# Patient Record
Sex: FEMALE
[De-identification: names, ages, dates, MRNs, and addresses within clinical notes are randomized per-mention and may not be internally consistent; named-entity substitution may affect disease eponyms.]

---

## 2021-10-12 ENCOUNTER — NON-APPOINTMENT (OUTPATIENT)
Age: 76
End: 2021-10-12

## 2021-10-19 ENCOUNTER — NON-APPOINTMENT (OUTPATIENT)
Age: 76
End: 2021-10-19

## 2021-11-24 ENCOUNTER — APPOINTMENT (OUTPATIENT)
Dept: FAMILY MEDICINE | Facility: CLINIC | Age: 76
End: 2021-11-24
Payer: MEDICARE

## 2021-11-24 VITALS — HEART RATE: 70 BPM | SYSTOLIC BLOOD PRESSURE: 120 MMHG | DIASTOLIC BLOOD PRESSURE: 70 MMHG

## 2021-11-24 DIAGNOSIS — Z80.41 FAMILY HISTORY OF MALIGNANT NEOPLASM OF OVARY: ICD-10-CM

## 2021-11-24 DIAGNOSIS — I10 ESSENTIAL (PRIMARY) HYPERTENSION: ICD-10-CM

## 2021-11-24 DIAGNOSIS — M81.0 AGE-RELATED OSTEOPOROSIS W/OUT CURRENT PATHOLOGICAL FRACTURE: ICD-10-CM

## 2021-11-24 DIAGNOSIS — R55 SYNCOPE AND COLLAPSE: ICD-10-CM

## 2021-11-24 DIAGNOSIS — K59.09 OTHER CONSTIPATION: ICD-10-CM

## 2021-11-24 DIAGNOSIS — Z82.49 FAMILY HISTORY OF ISCHEMIC HEART DISEASE AND OTHER DISEASES OF THE CIRCULATORY SYSTEM: ICD-10-CM

## 2021-11-24 DIAGNOSIS — Z76.89 PERSONS ENCOUNTERING HEALTH SERVICES IN OTHER SPECIFIED CIRCUMSTANCES: ICD-10-CM

## 2021-11-24 PROCEDURE — 99205 OFFICE O/P NEW HI 60 MIN: CPT | Mod: 25,95

## 2021-11-24 PROCEDURE — G0444 DEPRESSION SCREEN ANNUAL: CPT | Mod: 95,59

## 2021-11-24 RX ORDER — NUTRITIONAL SUPPLEMENT/FIBER
LIQUID (GRAM) ORAL
Refills: 0 | Status: ACTIVE | COMMUNITY

## 2021-11-30 ENCOUNTER — TRANSCRIPTION ENCOUNTER (OUTPATIENT)
Age: 76
End: 2021-11-30

## 2021-11-30 ENCOUNTER — APPOINTMENT (OUTPATIENT)
Dept: FAMILY MEDICINE | Facility: CLINIC | Age: 76
End: 2021-11-30
Payer: MEDICARE

## 2021-11-30 ENCOUNTER — LABORATORY RESULT (OUTPATIENT)
Age: 76
End: 2021-11-30

## 2021-11-30 VITALS
SYSTOLIC BLOOD PRESSURE: 120 MMHG | RESPIRATION RATE: 12 BRPM | DIASTOLIC BLOOD PRESSURE: 80 MMHG | HEART RATE: 63 BPM | OXYGEN SATURATION: 98 % | WEIGHT: 121 LBS | HEIGHT: 62 IN | BODY MASS INDEX: 22.26 KG/M2

## 2021-11-30 DIAGNOSIS — Z13.29 ENCOUNTER FOR SCREENING FOR OTHER SUSPECTED ENDOCRINE DISORDER: ICD-10-CM

## 2021-11-30 DIAGNOSIS — Z11.59 ENCOUNTER FOR SCREENING FOR OTHER VIRAL DISEASES: ICD-10-CM

## 2021-11-30 DIAGNOSIS — Z13.0 ENCOUNTER FOR SCREENING FOR DISEASES OF THE BLOOD AND BLOOD-FORMING ORGANS AND CERTAIN DISORDERS INVOLVING THE IMMUNE MECHANISM: ICD-10-CM

## 2021-11-30 DIAGNOSIS — R31.9 HEMATURIA, UNSPECIFIED: ICD-10-CM

## 2021-11-30 DIAGNOSIS — Z13.220 ENCOUNTER FOR SCREENING FOR LIPOID DISORDERS: ICD-10-CM

## 2021-11-30 LAB
BILIRUB UR QL STRIP: NEGATIVE
GLUCOSE UR-MCNC: NEGATIVE
HCG UR QL: 0.2 EU/DL
HGB UR QL STRIP.AUTO: NORMAL
KETONES UR-MCNC: NORMAL
LEUKOCYTE ESTERASE UR QL STRIP: NORMAL
NITRITE UR QL STRIP: NEGATIVE
PH UR STRIP: 7
PROT UR STRIP-MCNC: NEGATIVE
SP GR UR STRIP: 1.01

## 2021-11-30 PROCEDURE — G0402 INITIAL PREVENTIVE EXAM: CPT

## 2021-11-30 PROCEDURE — 81003 URINALYSIS AUTO W/O SCOPE: CPT | Mod: QW

## 2021-11-30 PROCEDURE — 36415 COLL VENOUS BLD VENIPUNCTURE: CPT

## 2021-11-30 RX ORDER — ESTRADIOL 0.1 MG/G
0.1 CREAM VAGINAL
Refills: 0 | Status: ACTIVE | COMMUNITY

## 2021-11-30 NOTE — HEALTH RISK ASSESSMENT
[Patient reported colonoscopy was normal] : Patient reported colonoscopy was normal [Intercurrent hospitalizations] : was admitted to the hospital  [No] : In the past 12 months have you used drugs other than those required for medical reasons? No [0] : 2) Feeling down, depressed, or hopeless: Not at all (0) [PHQ-2 Negative - No further assessment needed] : PHQ-2 Negative - No further assessment needed [Patient reported mammogram was normal] : Patient reported mammogram was normal [Patient reported bone density results were abnormal] : Patient reported bone density results were abnormal [MammogramDate] : 2021 [BoneDensityComments] : OP [ColonoscopyDate] : 10 yrs ago  [] : No [de-identified] : syncope in August  [de-identified] : Dr. Lang (cardio), GI, acupuncture  [[id number]] : 0

## 2021-11-30 NOTE — REVIEW OF SYSTEMS
[Constipation] : constipation [Muscle Pain] : muscle pain [Negative] : Heme/Lymph [FreeTextEntry9] : leg cramping

## 2021-11-30 NOTE — PLAN
[FreeTextEntry1] : 76 yr old female establish care \par \par Will return in person for examination and routine blood work

## 2021-11-30 NOTE — HISTORY OF PRESENT ILLNESS
[FreeTextEntry8] : Establish care with new PCP. History of HTN ( not on medication), arthritis, vasovagal syncope, constipation (prunes, nourish and Pedley) and has to disimpact to relieve. Denies changes in vision, dizziness, chest pain, palpitations and lower extremity edema.\par \par \par States this year she was on a flight to [place] had repeated syncopal episodes and was taken to the hospital. She was seen by cardiologist had Holter monitor and workup was normal. In August she fainted again.\par \par Had COVID shot, declined flu, prior pneumonia vaccine

## 2021-12-01 ENCOUNTER — TRANSCRIPTION ENCOUNTER (OUTPATIENT)
Age: 76
End: 2021-12-01

## 2021-12-01 DIAGNOSIS — Z11.59 ENCOUNTER FOR SCREENING FOR OTHER VIRAL DISEASES: ICD-10-CM

## 2021-12-01 LAB
25(OH)D3 SERPL-MCNC: 79.7 NG/ML
ALBUMIN SERPL ELPH-MCNC: 4.7 G/DL
ALP BLD-CCNC: 62 U/L
ALT SERPL-CCNC: 14 U/L
ANION GAP SERPL CALC-SCNC: 14 MMOL/L
APPEARANCE: CLEAR
AST SERPL-CCNC: 18 U/L
BACTERIA: NEGATIVE
BASOPHILS # BLD AUTO: 0.06 K/UL
BASOPHILS NFR BLD AUTO: 1.1 %
BILIRUB SERPL-MCNC: 0.4 MG/DL
BILIRUBIN URINE: NEGATIVE
BLOOD URINE: ABNORMAL
BUN SERPL-MCNC: 16 MG/DL
CALCIUM SERPL-MCNC: 10 MG/DL
CHLORIDE SERPL-SCNC: 95 MMOL/L
CHOLEST SERPL-MCNC: 205 MG/DL
CO2 SERPL-SCNC: 25 MMOL/L
COLOR: COLORLESS
CREAT SERPL-MCNC: 0.94 MG/DL
EOSINOPHIL # BLD AUTO: 0.31 K/UL
EOSINOPHIL NFR BLD AUTO: 5.6 %
ESTIMATED AVERAGE GLUCOSE: 114 MG/DL
FOLATE SERPL-MCNC: 10.9 NG/ML
GLUCOSE QUALITATIVE U: NEGATIVE
GLUCOSE SERPL-MCNC: 87 MG/DL
HBA1C MFR BLD HPLC: 5.6 %
HCT VFR BLD CALC: 41.6 %
HDLC SERPL-MCNC: 71 MG/DL
HGB BLD-MCNC: 13.5 G/DL
HYALINE CASTS: 1 /LPF
IMM GRANULOCYTES NFR BLD AUTO: 0.4 %
KETONES URINE: NORMAL
LDLC SERPL CALC-MCNC: 125 MG/DL
LEUKOCYTE ESTERASE URINE: NEGATIVE
LYMPHOCYTES # BLD AUTO: 1.81 K/UL
LYMPHOCYTES NFR BLD AUTO: 32.8 %
MAGNESIUM SERPL-MCNC: 2.3 MG/DL
MAN DIFF?: NORMAL
MCHC RBC-ENTMCNC: 28.4 PG
MCHC RBC-ENTMCNC: 32.5 GM/DL
MCV RBC AUTO: 87.6 FL
MICROSCOPIC-UA: NORMAL
MONOCYTES # BLD AUTO: 0.45 K/UL
MONOCYTES NFR BLD AUTO: 8.2 %
NEUTROPHILS # BLD AUTO: 2.87 K/UL
NEUTROPHILS NFR BLD AUTO: 51.9 %
NITRITE URINE: NEGATIVE
NONHDLC SERPL-MCNC: 134 MG/DL
PH URINE: 6.5
PLATELET # BLD AUTO: 290 K/UL
POTASSIUM SERPL-SCNC: 4.8 MMOL/L
PROT SERPL-MCNC: 7 G/DL
PROTEIN URINE: NEGATIVE
RBC # BLD: 4.75 M/UL
RBC # FLD: 13.8 %
RED BLOOD CELLS URINE: 2 /HPF
SODIUM SERPL-SCNC: 134 MMOL/L
SPECIFIC GRAVITY URINE: 1
SQUAMOUS EPITHELIAL CELLS: 2 /HPF
T3FREE SERPL-MCNC: 2.8 PG/ML
T4 FREE SERPL-MCNC: 1.4 NG/DL
TRIGL SERPL-MCNC: 43 MG/DL
TSH SERPL-ACNC: 2.5 UIU/ML
UROBILINOGEN URINE: NORMAL
VIT B12 SERPL-MCNC: >2000 PG/ML
WBC # FLD AUTO: 5.52 K/UL
WHITE BLOOD CELLS URINE: 2 /HPF

## 2021-12-01 NOTE — PHYSICAL EXAM
[No Acute Distress] : no acute distress [Well Nourished] : well nourished [Normal Sclera/Conjunctiva] : normal sclera/conjunctiva [PERRL] : pupils equal round and reactive to light [EOMI] : extraocular movements intact [Normal Outer Ear/Nose] : the outer ears and nose were normal in appearance [Normal Oropharynx] : the oropharynx was normal [Normal TMs] : both tympanic membranes were normal [No JVD] : no jugular venous distention [No Lymphadenopathy] : no lymphadenopathy [Supple] : supple [No Respiratory Distress] : no respiratory distress  [No Accessory Muscle Use] : no accessory muscle use [Clear to Auscultation] : lungs were clear to auscultation bilaterally [Normal Rate] : normal rate  [Regular Rhythm] : with a regular rhythm [Normal S1, S2] : normal S1 and S2 [No Murmur] : no murmur heard [No Carotid Bruits] : no carotid bruits [No Extremity Clubbing/Cyanosis] : no extremity clubbing/cyanosis [Soft] : abdomen soft [Non Tender] : non-tender [Non-distended] : non-distended [No HSM] : no HSM [Normal Bowel Sounds] : normal bowel sounds [Normal Posterior Cervical Nodes] : no posterior cervical lymphadenopathy [Normal Anterior Cervical Nodes] : no anterior cervical lymphadenopathy [No Spinal Tenderness] : no spinal tenderness [Grossly Normal Strength/Tone] : grossly normal strength/tone [No Skin Lesions] : no skin lesions [Normal Gait] : normal gait [Normal Affect] : the affect was normal [Alert and Oriented x3] : oriented to person, place, and time [Normal Insight/Judgement] : insight and judgment were intact [Kyphosis] : no kyphosis [de-identified] : enlarged thyroid gland

## 2021-12-01 NOTE — END OF VISIT
[Time Spent: ___ minutes] : I have spent [unfilled] minutes of time on the encounter. [FreeTextEntry3] : 65 minutes was spent with patient. Extensive discussion regarding medical compliance with medications, healthy lifestyle and importance of behavioral health.\par

## 2021-12-01 NOTE — REVIEW OF SYSTEMS
[Constipation] : constipation [Joint Pain] : joint pain [Joint Stiffness] : joint stiffness [Negative] : Heme/Lymph [Abdominal Pain] : no abdominal pain [Nausea] : no nausea [Vomiting] : no vomiting [Heartburn] : no heartburn

## 2021-12-01 NOTE — HEALTH RISK ASSESSMENT
[Excellent] : ~his/her~  mood as  excellent [No] : In the past 12 months have you used drugs other than those required for medical reasons? No [No falls in past year] : Patient reported no falls in the past year [0] : 2) Feeling down, depressed, or hopeless: Not at all (0) [PHQ-2 Negative - No further assessment needed] : PHQ-2 Negative - No further assessment needed [Patient reported mammogram was normal] : Patient reported mammogram was normal [Patient reported PAP Smear was normal] : Patient reported PAP Smear was normal [Patient reported bone density results were normal] : Patient reported bone density results were normal [Patient reported colonoscopy was normal] : Patient reported colonoscopy was normal [With Significant Other] : lives with significant other [With Family] : lives with family [Retired] : retired [] :  [Significant Other] : lives with significant other [Feels Safe at Home] : Feels safe at home [Fully functional (bathing, dressing, toileting, transferring, walking, feeding)] : Fully functional (bathing, dressing, toileting, transferring, walking, feeding) [Fully functional (using the telephone, shopping, preparing meals, housekeeping, doing laundry, using] : Fully functional and needs no help or supervision to perform IADLs (using the telephone, shopping, preparing meals, housekeeping, doing laundry, using transportation, managing medications and managing finances) [Smoke Detector] : smoke detector [Carbon Monoxide Detector] : carbon monoxide detector [Seat Belt] :  uses seat belt [Sunscreen] : uses sunscreen [FreeTextEntry1] : none  [] : No [de-identified] : no  [de-identified] : endo, GI, uro, GYN, derm  [de-identified] : physically active  [de-identified] : healthy diet  [[id number]] : 0 [Reports changes in hearing] : Reports no changes in hearing [Reports changes in vision] : Reports no changes in vision [Reports normal functional visual acuity (ie: able to read med bottle)] : Reports poor functional visual acuity.  [Reports changes in dental health] : Reports no changes in dental health [MammogramDate] : 2020 [BoneDensityComments] : OP  [AdvancecareDate] : 11/20/2021

## 2021-12-01 NOTE — HISTORY OF PRESENT ILLNESS
[FreeTextEntry1] : physical examination.  [de-identified] : 76 yr old female is here for physical examination. Admits to chronic joint pain, hot flashes, night sweats, constipation. Denies changes in vision, dizziness, chest pain, palpitations and lower extremity edema.\par

## 2021-12-01 NOTE — PLAN
[FreeTextEntry1] : 76 yr old female CPE \par \par Normal physical examination and vital signs \par In office U/A showing trace leukocytes and blood \par will send for microanalysis and urine cx \par \par pt takes no reported medications \par given script for thyroid US \par pt is up to-date with health maintenance \par will check routine lab work today to screen for anemia, CAD, liver and kidney abnormalities, and thyroid disorders (CBC, CMP, lipid, TSH along with vitamins) \par due to chronic constipation advised to trial MiraLAX PRN constipation (f/u with GI) \par \par RTO for follow up as directed \par will review prior records and imaging \par

## 2021-12-06 ENCOUNTER — TRANSCRIPTION ENCOUNTER (OUTPATIENT)
Age: 76
End: 2021-12-06

## 2021-12-06 LAB
BACTERIA UR CULT: NORMAL
COVID-19 NUCLEOCAPSID  GAM ANTIBODY INTERPRETATION: NEGATIVE
COVID-19 SPIKE DOMAIN ANTIBODY INTERPRETATION: POSITIVE
SARS-COV-2 AB SERPL IA-ACNC: >250 U/ML
SARS-COV-2 AB SERPL QL IA: 0.09 INDEX
ZINC SERPL-MCNC: 124 UG/DL

## 2021-12-27 ENCOUNTER — TRANSCRIPTION ENCOUNTER (OUTPATIENT)
Age: 76
End: 2021-12-27

## 2022-04-11 PROBLEM — Z11.59 SCREENING FOR VIRAL DISEASE: Status: ACTIVE | Noted: 2021-12-01

## 2022-07-12 ENCOUNTER — APPOINTMENT (OUTPATIENT)
Dept: FAMILY MEDICINE | Facility: CLINIC | Age: 77
End: 2022-07-12

## 2022-10-21 DIAGNOSIS — R92.2 INCONCLUSIVE MAMMOGRAM: ICD-10-CM

## 2022-10-21 DIAGNOSIS — Z12.39 ENCOUNTER FOR OTHER SCREENING FOR MALIGNANT NEOPLASM OF BREAST: ICD-10-CM

## 2022-11-07 ENCOUNTER — APPOINTMENT (OUTPATIENT)
Dept: FAMILY MEDICINE | Facility: CLINIC | Age: 77
End: 2022-11-07

## 2022-11-07 DIAGNOSIS — N63.10 UNSPECIFIED LUMP IN THE RIGHT BREAST, UNSPECIFIED QUADRANT: ICD-10-CM

## 2022-12-02 ENCOUNTER — NON-APPOINTMENT (OUTPATIENT)
Age: 77
End: 2022-12-02

## 2022-12-02 ENCOUNTER — APPOINTMENT (OUTPATIENT)
Dept: FAMILY MEDICINE | Facility: CLINIC | Age: 77
End: 2022-12-02

## 2022-12-02 VITALS
HEART RATE: 63 BPM | SYSTOLIC BLOOD PRESSURE: 144 MMHG | DIASTOLIC BLOOD PRESSURE: 88 MMHG | TEMPERATURE: 97.6 F | OXYGEN SATURATION: 99 % | WEIGHT: 122 LBS | HEIGHT: 62 IN | BODY MASS INDEX: 22.45 KG/M2 | RESPIRATION RATE: 12 BRPM

## 2022-12-02 DIAGNOSIS — Z12.11 ENCOUNTER FOR SCREENING FOR MALIGNANT NEOPLASM OF COLON: ICD-10-CM

## 2022-12-02 DIAGNOSIS — N63.10 UNSPECIFIED LUMP IN THE RIGHT BREAST, UNSPECIFIED QUADRANT: ICD-10-CM

## 2022-12-02 DIAGNOSIS — R73.9 HYPERGLYCEMIA, UNSPECIFIED: ICD-10-CM

## 2022-12-02 DIAGNOSIS — R00.2 PALPITATIONS: ICD-10-CM

## 2022-12-02 DIAGNOSIS — Z00.00 ENCOUNTER FOR GENERAL ADULT MEDICAL EXAMINATION W/OUT ABNORMAL FINDINGS: ICD-10-CM

## 2022-12-02 LAB
BILIRUB UR QL STRIP: NEGATIVE
GLUCOSE UR-MCNC: NEGATIVE
HCG UR QL: 0.2 EU/DL
HGB UR QL STRIP.AUTO: NORMAL
KETONES UR-MCNC: NEGATIVE
LEUKOCYTE ESTERASE UR QL STRIP: NEGATIVE
NITRITE UR QL STRIP: NEGATIVE
PH UR STRIP: 7
PROT UR STRIP-MCNC: NEGATIVE
SP GR UR STRIP: 1.01

## 2022-12-02 PROCEDURE — 36415 COLL VENOUS BLD VENIPUNCTURE: CPT

## 2022-12-02 PROCEDURE — 81003 URINALYSIS AUTO W/O SCOPE: CPT | Mod: QW

## 2022-12-02 PROCEDURE — 93000 ELECTROCARDIOGRAM COMPLETE: CPT

## 2022-12-02 PROCEDURE — 99397 PER PM REEVAL EST PAT 65+ YR: CPT | Mod: 25

## 2022-12-02 NOTE — HEALTH RISK ASSESSMENT
[Excellent] : ~his/her~  mood as  excellent [Never] : Never [No] : In the past 12 months have you used drugs other than those required for medical reasons? No [No falls in past year] : Patient reported no falls in the past year [Patient reported mammogram was normal] : Patient reported mammogram was normal [Patient reported PAP Smear was normal] : Patient reported PAP Smear was normal [Patient reported bone density results were abnormal] : Patient reported bone density results were abnormal [Patient reported colonoscopy was normal] : Patient reported colonoscopy was normal [HIV test declined] : HIV test declined [Hepatitis C test declined] : Hepatitis C test declined [None] : None [With Family] : lives with family [Retired] : retired [] :  [Feels Safe at Home] : Feels safe at home [Fully functional (bathing, dressing, toileting, transferring, walking, feeding)] : Fully functional (bathing, dressing, toileting, transferring, walking, feeding) [Fully functional (using the telephone, shopping, preparing meals, housekeeping, doing laundry, using] : Fully functional and needs no help or supervision to perform IADLs (using the telephone, shopping, preparing meals, housekeeping, doing laundry, using transportation, managing medications and managing finances) [Smoke Detector] : smoke detector [Carbon Monoxide Detector] : carbon monoxide detector [Seat Belt] :  uses seat belt [Sunscreen] : uses sunscreen [FreeTextEntry1] : none  [de-identified] : no  [de-identified] : GYN, rheumatologist  [de-identified] : cardio  [de-identified] : regular  [Change in mental status noted] : No change in mental status noted [Reports changes in hearing] : Reports no changes in hearing [Reports changes in vision] : Reports no changes in vision [Reports normal functional visual acuity (ie: able to read med bottle)] : Reports poor functional visual acuity.  [Reports changes in dental health] : Reports no changes in dental health [MammogramDate] : 2022 [PapSmearDate] : 10/2021 [BoneDensityDate] : 2/2022 [BoneDensityComments] : OP  [ColonoscopyDate] : 2015

## 2022-12-02 NOTE — HISTORY OF PRESENT ILLNESS
[FreeTextEntry1] : CPE  [de-identified] : 77 yr old female is here for follow up. She is no longer having constipation. Denies changes in vision, dizziness, chest pain, palpitations and lower extremity edema.\par

## 2022-12-02 NOTE — PLAN
[FreeTextEntry1] : ECG NSR \par urine moderate blood will send for analysis \par f/u with rheum, derm and GYN \par will repeat RT breast US, given script \par routine labs today \par continue all OTC medications \par FOBT given today \par RTO as routine for follow up.\par

## 2022-12-06 DIAGNOSIS — E78.5 HYPERLIPIDEMIA, UNSPECIFIED: ICD-10-CM

## 2022-12-06 LAB
25(OH)D3 SERPL-MCNC: 86.9 NG/ML
ALBUMIN SERPL ELPH-MCNC: 4.4 G/DL
ALP BLD-CCNC: 53 U/L
ALT SERPL-CCNC: 12 U/L
ANION GAP SERPL CALC-SCNC: 7 MMOL/L
APPEARANCE: CLEAR
AST SERPL-CCNC: 17 U/L
BACTERIA: NEGATIVE
BASOPHILS # BLD AUTO: 0.04 K/UL
BASOPHILS NFR BLD AUTO: 0.9 %
BILIRUB SERPL-MCNC: 0.5 MG/DL
BILIRUBIN URINE: NEGATIVE
BLOOD URINE: NEGATIVE
BUN SERPL-MCNC: 13 MG/DL
CALCIUM SERPL-MCNC: 9.6 MG/DL
CHLORIDE SERPL-SCNC: 93 MMOL/L
CHOLEST SERPL-MCNC: 223 MG/DL
CO2 SERPL-SCNC: 31 MMOL/L
COLOR: COLORLESS
CREAT SERPL-MCNC: 0.95 MG/DL
EGFR: 62 ML/MIN/1.73M2
EOSINOPHIL # BLD AUTO: 0.32 K/UL
EOSINOPHIL NFR BLD AUTO: 7.4 %
ESTIMATED AVERAGE GLUCOSE: 114 MG/DL
FOLATE SERPL-MCNC: 12.5 NG/ML
GLUCOSE QUALITATIVE U: NEGATIVE
GLUCOSE SERPL-MCNC: 87 MG/DL
HBA1C MFR BLD HPLC: 5.6 %
HCT VFR BLD CALC: 40.2 %
HDLC SERPL-MCNC: 72 MG/DL
HGB BLD-MCNC: 13 G/DL
HYALINE CASTS: 3 /LPF
IMM GRANULOCYTES NFR BLD AUTO: 0.2 %
KETONES URINE: NEGATIVE
LDLC SERPL CALC-MCNC: 140 MG/DL
LEUKOCYTE ESTERASE URINE: NEGATIVE
LYMPHOCYTES # BLD AUTO: 1.7 K/UL
LYMPHOCYTES NFR BLD AUTO: 39.4 %
MAN DIFF?: NORMAL
MCHC RBC-ENTMCNC: 28.1 PG
MCHC RBC-ENTMCNC: 32.3 GM/DL
MCV RBC AUTO: 86.8 FL
MICROSCOPIC-UA: NORMAL
MONOCYTES # BLD AUTO: 0.46 K/UL
MONOCYTES NFR BLD AUTO: 10.6 %
NEUTROPHILS # BLD AUTO: 1.79 K/UL
NEUTROPHILS NFR BLD AUTO: 41.5 %
NITRITE URINE: NEGATIVE
NONHDLC SERPL-MCNC: 150 MG/DL
PH URINE: 7
PLATELET # BLD AUTO: 202 K/UL
POTASSIUM SERPL-SCNC: 4.8 MMOL/L
PROT SERPL-MCNC: 6.7 G/DL
PROTEIN URINE: NEGATIVE
RBC # BLD: 4.63 M/UL
RBC # FLD: 13.6 %
RED BLOOD CELLS URINE: 1 /HPF
SODIUM SERPL-SCNC: 132 MMOL/L
SPECIFIC GRAVITY URINE: 1
SQUAMOUS EPITHELIAL CELLS: 3 /HPF
T3FREE SERPL-MCNC: 2.63 PG/ML
T4 FREE SERPL-MCNC: 1.3 NG/DL
THYROGLOB AB SERPL-ACNC: <20 IU/ML
THYROPEROXIDASE AB SERPL IA-ACNC: 11.3 IU/ML
TRIGL SERPL-MCNC: 52 MG/DL
TSH SERPL-ACNC: 2.21 UIU/ML
UROBILINOGEN URINE: NORMAL
VIT B12 SERPL-MCNC: 983 PG/ML
WBC # FLD AUTO: 4.32 K/UL
WHITE BLOOD CELLS URINE: 0 /HPF

## 2022-12-07 LAB — TSI ACT/NOR SER: <0.1 IU/L

## 2022-12-09 LAB — HEMOCCULT STL QL IA: NEGATIVE

## 2022-12-12 ENCOUNTER — TRANSCRIPTION ENCOUNTER (OUTPATIENT)
Age: 77
End: 2022-12-12

## 2022-12-19 ENCOUNTER — TRANSCRIPTION ENCOUNTER (OUTPATIENT)
Age: 77
End: 2022-12-19

## 2023-01-30 ENCOUNTER — OFFICE (OUTPATIENT)
Dept: URBAN - METROPOLITAN AREA CLINIC 103 | Facility: CLINIC | Age: 78
Setting detail: OPHTHALMOLOGY
End: 2023-01-30
Payer: COMMERCIAL

## 2023-01-30 DIAGNOSIS — H25.13: ICD-10-CM

## 2023-01-30 DIAGNOSIS — H40.033: ICD-10-CM

## 2023-01-30 DIAGNOSIS — H43.393: ICD-10-CM

## 2023-01-30 PROBLEM — H40.032 NARROW ANGLES; RIGHT EYE, LEFT EYE, BOTH EYES: Status: ACTIVE | Noted: 2023-01-30

## 2023-01-30 PROBLEM — H40.031 NARROW ANGLES; RIGHT EYE, LEFT EYE, BOTH EYES: Status: ACTIVE | Noted: 2023-01-30

## 2023-01-30 PROCEDURE — 99214 OFFICE O/P EST MOD 30 MIN: CPT | Performed by: OPHTHALMOLOGY

## 2023-01-30 PROCEDURE — 92020 GONIOSCOPY: CPT | Performed by: OPHTHALMOLOGY

## 2023-01-30 PROCEDURE — 92250 FUNDUS PHOTOGRAPHY W/I&R: CPT | Performed by: OPHTHALMOLOGY

## 2023-01-30 ASSESSMENT — SPHEQUIV_DERIVED
OD_SPHEQUIV: -1.125
OS_SPHEQUIV: 0.875
OD_SPHEQUIV: 0.25
OS_SPHEQUIV: -0.875
OD_SPHEQUIV: 0.375
OS_SPHEQUIV: 0.875

## 2023-01-30 ASSESSMENT — TONOMETRY
OD_IOP_MMHG: 16
OS_IOP_MMHG: 11
OS_IOP_MMHG: 16
OD_IOP_MMHG: 15

## 2023-01-30 ASSESSMENT — REFRACTION_CURRENTRX
OD_VPRISM_DIRECTION: SV
OD_SPHERE: +2.75
OS_VPRISM_DIRECTION: SV
OD_OVR_VA: 20/
OS_CYLINDER: -1.50
OD_AXIS: 086
OS_OVR_VA: 20/
OS_SPHERE: +3.75
OD_CYLINDER: -1.25
OS_AXIS: 104

## 2023-01-30 ASSESSMENT — KERATOMETRY
OS_K1POWER_DIOPTERS: 43.25
OS_AXISANGLE_DEGREES: 012
OD_AXISANGLE_DEGREES: 175
OS_K2POWER_DIOPTERS: 44.50
OD_K2POWER_DIOPTERS: 44.50
OD_K1POWER_DIOPTERS: 44.00

## 2023-01-30 ASSESSMENT — REFRACTION_MANIFEST
OS_CYLINDER: -1.75
OS_AXIS: 100
OD_CYLINDER: -1.50
OS_VA1: 20/20
OS_AXIS: 105
OS_SPHERE: +1.75
OD_AXIS: 85
OD_SPHERE: +1.00
OD_VA1: 20/15-3
OU_VA: 20/15
OS_ADD: +2.25
OD_VA1: 20/25
OU_VA: 20/20
OS_VA1: 20/25-1
OD_CYLINDER: -1.25
OS_ADD: +2.25
OS_SPHERE: +1.75
OD_AXIS: 085
OD_ADD: +2.25
OD_ADD: +2.25
OD_SPHERE: +1.00
OS_CYLINDER: -1.75

## 2023-01-30 ASSESSMENT — REFRACTION_AUTOREFRACTION
OS_AXIS: 105
OD_CYLINDER: -1.25
OD_AXIS: 090
OD_SPHERE: -0.50
OS_SPHERE: -0.25
OS_CYLINDER: -1.25

## 2023-01-30 ASSESSMENT — AXIALLENGTH_DERIVED
OD_AL: 23.1777
OD_AL: 23.7559
OS_AL: 23.1228
OS_AL: 23.7968
OD_AL: 23.2248
OS_AL: 23.1228

## 2023-01-30 ASSESSMENT — PACHYMETRY
OS_CT_CORRECTION: 3
OD_CT_UM: 506
OD_CT_CORRECTION: 3
OS_CT_UM: 506

## 2023-01-30 ASSESSMENT — VISUAL ACUITY
OS_BCVA: 20/40
OD_BCVA: 20/40

## 2023-01-30 ASSESSMENT — CONFRONTATIONAL VISUAL FIELD TEST (CVF)
OD_FINDINGS: FULL
OS_FINDINGS: FULL

## 2023-02-07 ENCOUNTER — TRANSCRIPTION ENCOUNTER (OUTPATIENT)
Age: 78
End: 2023-02-07

## 2023-02-15 ENCOUNTER — ASC (OUTPATIENT)
Dept: URBAN - METROPOLITAN AREA SURGERY 8 | Facility: SURGERY | Age: 78
Setting detail: OPHTHALMOLOGY
End: 2023-02-15
Payer: COMMERCIAL

## 2023-02-15 DIAGNOSIS — H40.031: ICD-10-CM

## 2023-02-15 PROCEDURE — 66761 REVISION OF IRIS: CPT | Performed by: OPHTHALMOLOGY

## 2023-02-15 ASSESSMENT — REFRACTION_CURRENTRX
OS_VPRISM_DIRECTION: SV
OD_VPRISM_DIRECTION: SV
OS_CYLINDER: -1.50
OS_OVR_VA: 20/
OD_CYLINDER: -1.25
OD_SPHERE: +2.75
OD_AXIS: 086
OD_OVR_VA: 20/
OS_SPHERE: +3.75
OS_AXIS: 104

## 2023-02-15 ASSESSMENT — AXIALLENGTH_DERIVED
OS_AL: 23.7968
OS_AL: 23.1228
OD_AL: 23.7559
OS_AL: 23.1228
OD_AL: 23.1777
OD_AL: 23.2248

## 2023-02-15 ASSESSMENT — REFRACTION_AUTOREFRACTION
OS_SPHERE: -0.25
OD_CYLINDER: -1.25
OD_AXIS: 090
OS_AXIS: 105
OD_SPHERE: -0.50
OS_CYLINDER: -1.25

## 2023-02-15 ASSESSMENT — REFRACTION_MANIFEST
OS_ADD: +2.25
OD_SPHERE: +1.00
OS_CYLINDER: -1.75
OD_SPHERE: +1.00
OD_ADD: +2.25
OU_VA: 20/15
OS_CYLINDER: -1.75
OS_AXIS: 100
OU_VA: 20/20
OS_SPHERE: +1.75
OD_VA1: 20/15-3
OS_VA1: 20/25-1
OS_VA1: 20/20
OD_CYLINDER: -1.50
OS_AXIS: 105
OD_ADD: +2.25
OS_ADD: +2.25
OD_AXIS: 85
OD_CYLINDER: -1.25
OS_SPHERE: +1.75
OD_AXIS: 085
OD_VA1: 20/25

## 2023-02-15 ASSESSMENT — KERATOMETRY
OD_K2POWER_DIOPTERS: 44.50
OD_AXISANGLE_DEGREES: 175
OS_K1POWER_DIOPTERS: 43.25
OS_K2POWER_DIOPTERS: 44.50
OD_K1POWER_DIOPTERS: 44.00
OS_AXISANGLE_DEGREES: 012

## 2023-02-15 ASSESSMENT — SPHEQUIV_DERIVED
OD_SPHEQUIV: 0.375
OD_SPHEQUIV: -1.125
OS_SPHEQUIV: 0.875
OD_SPHEQUIV: 0.25
OS_SPHEQUIV: -0.875
OS_SPHEQUIV: 0.875

## 2023-02-15 ASSESSMENT — VISUAL ACUITY
OS_BCVA: 20/40
OD_BCVA: 20/40

## 2023-02-17 ENCOUNTER — ASC (OUTPATIENT)
Dept: URBAN - METROPOLITAN AREA SURGERY 8 | Facility: SURGERY | Age: 78
Setting detail: OPHTHALMOLOGY
End: 2023-02-17
Payer: COMMERCIAL

## 2023-02-17 DIAGNOSIS — H40.032: ICD-10-CM

## 2023-02-17 PROBLEM — H40.031 NARROW ANGLES; RIGHT EYE, LEFT EYE: Status: ACTIVE | Noted: 2023-02-17

## 2023-02-17 PROCEDURE — 66761 REVISION OF IRIS: CPT | Performed by: OPHTHALMOLOGY

## 2023-02-17 ASSESSMENT — SPHEQUIV_DERIVED
OS_SPHEQUIV: 0.875
OS_SPHEQUIV: 0.875
OD_SPHEQUIV: 0.375
OS_SPHEQUIV: -0.875
OD_SPHEQUIV: -1.125
OD_SPHEQUIV: 0.25

## 2023-02-17 ASSESSMENT — REFRACTION_AUTOREFRACTION
OS_CYLINDER: -1.25
OD_SPHERE: -0.50
OS_SPHERE: -0.25
OD_AXIS: 090
OD_CYLINDER: -1.25
OS_AXIS: 105

## 2023-02-17 ASSESSMENT — REFRACTION_MANIFEST
OD_CYLINDER: -1.25
OD_CYLINDER: -1.50
OS_SPHERE: +1.75
OD_AXIS: 085
OS_CYLINDER: -1.75
OD_AXIS: 85
OS_VA1: 20/20
OD_ADD: +2.25
OS_ADD: +2.25
OU_VA: 20/20
OD_VA1: 20/25
OD_SPHERE: +1.00
OD_ADD: +2.25
OD_VA1: 20/15-3
OS_ADD: +2.25
OS_AXIS: 100
OS_SPHERE: +1.75
OU_VA: 20/15
OS_VA1: 20/25-1
OD_SPHERE: +1.00
OS_AXIS: 105
OS_CYLINDER: -1.75

## 2023-02-17 ASSESSMENT — AXIALLENGTH_DERIVED
OS_AL: 23.7968
OD_AL: 23.2248
OS_AL: 23.1228
OD_AL: 23.1777
OD_AL: 23.7559
OS_AL: 23.1228

## 2023-02-17 ASSESSMENT — VISUAL ACUITY
OD_BCVA: 20/40
OS_BCVA: 20/40

## 2023-02-17 ASSESSMENT — REFRACTION_CURRENTRX
OS_SPHERE: +3.75
OD_SPHERE: +2.75
OS_VPRISM_DIRECTION: SV
OS_OVR_VA: 20/
OD_OVR_VA: 20/
OS_CYLINDER: -1.50
OS_AXIS: 104
OD_CYLINDER: -1.25
OD_AXIS: 086
OD_VPRISM_DIRECTION: SV

## 2023-02-17 ASSESSMENT — KERATOMETRY
OS_K2POWER_DIOPTERS: 44.50
OD_K1POWER_DIOPTERS: 44.00
OD_K2POWER_DIOPTERS: 44.50
OS_K1POWER_DIOPTERS: 43.25
OD_AXISANGLE_DEGREES: 175
OS_AXISANGLE_DEGREES: 012

## 2023-03-13 ENCOUNTER — OFFICE (OUTPATIENT)
Dept: URBAN - METROPOLITAN AREA CLINIC 103 | Facility: CLINIC | Age: 78
Setting detail: OPHTHALMOLOGY
End: 2023-03-13
Payer: COMMERCIAL

## 2023-03-13 DIAGNOSIS — H40.033: ICD-10-CM

## 2023-03-13 DIAGNOSIS — H25.13: ICD-10-CM

## 2023-03-13 PROCEDURE — 92012 INTRM OPH EXAM EST PATIENT: CPT | Performed by: OPHTHALMOLOGY

## 2023-03-13 ASSESSMENT — REFRACTION_MANIFEST
OS_VA1: 20/20
OU_VA: 20/15
OS_ADD: +2.25
OD_VA1: 20/15-3
OD_VA1: 20/25
OS_AXIS: 100
OS_ADD: +2.25
OS_CYLINDER: -1.75
OD_CYLINDER: -1.50
OD_SPHERE: +1.00
OD_ADD: +2.25
OS_SPHERE: +1.75
OD_AXIS: 85
OS_SPHERE: +1.75
OD_ADD: +2.25
OS_AXIS: 105
OD_CYLINDER: -1.25
OD_AXIS: 085
OS_VA1: 20/25-1
OS_CYLINDER: -1.75
OD_SPHERE: +1.00
OU_VA: 20/20

## 2023-03-13 ASSESSMENT — TONOMETRY
OD_IOP_MMHG: 16
OS_IOP_MMHG: 15
OD_IOP_MMHG: 15
OS_IOP_MMHG: 15

## 2023-03-13 ASSESSMENT — KERATOMETRY
METHOD_AUTO_MANUAL: MANUAL
OS_AXISANGLE_DEGREES: 103
OS_K1POWER_DIOPTERS: 43.50
OS_CYLAXISANGLE_DEGREES: 013
OS_K2POWER_DIOPTERS: 44.50
OD_K1K2_AVERAGE: 44.5
OS_K2POWER_DIOPTERS: 44.50
OD_K2POWER_DIOPTERS: 44.75
OD_AXISANGLE2_DEGREES: 178
OS_K1K2_AVERAGE: 44
OD_K2POWER_DIOPTERS: 44.75
OD_K1POWER_DIOPTERS: 44.25
OD_CYLPOWER_DEGREES: 0.5
OS_K1POWER_DIOPTERS: 43.50
OS_CYLPOWER_DEGREES: 1
OD_CYLAXISANGLE_DEGREES: 178
OD_AXISANGLE_DEGREES: 88
OS_AXISANGLE_DEGREES: 013
OD_AXISANGLE_DEGREES: 178
OS_AXISANGLE2_DEGREES: 013
OD_K1POWER_DIOPTERS: 44.25

## 2023-03-13 ASSESSMENT — REFRACTION_CURRENTRX
OD_SPHERE: +2.75
OD_AXIS: 086
OS_VPRISM_DIRECTION: SV
OS_SPHERE: +3.75
OS_CYLINDER: -1.50
OS_OVR_VA: 20/
OD_VPRISM_DIRECTION: SV
OS_AXIS: 104
OD_CYLINDER: -1.25
OD_OVR_VA: 20/

## 2023-03-13 ASSESSMENT — CONFRONTATIONAL VISUAL FIELD TEST (CVF)
OS_FINDINGS: FULL
OD_FINDINGS: FULL

## 2023-03-13 ASSESSMENT — REFRACTION_AUTOREFRACTION
OD_SPHERE: -1.00
OS_AXIS: 106
OD_AXIS: 092
OS_CYLINDER: -1.25
OS_SPHERE: -0.25
OD_CYLINDER: -1.00

## 2023-03-13 ASSESSMENT — SPHEQUIV_DERIVED
OD_SPHEQUIV: -1.5
OD_SPHEQUIV: 0.375
OS_SPHEQUIV: 0.875
OS_SPHEQUIV: -0.875
OS_SPHEQUIV: 0.875
OD_SPHEQUIV: 0.25

## 2023-03-13 ASSESSMENT — PACHYMETRY
OD_CT_CORRECTION: 3
OD_CT_UM: 506
OS_CT_CORRECTION: 3
OS_CT_UM: 506

## 2023-03-13 ASSESSMENT — AXIALLENGTH_DERIVED
OS_AL: 23.0788
OS_AL: 23.7502
OD_AL: 23.1362
OD_AL: 23.811
OS_AL: 23.0788
OD_AL: 23.0894

## 2023-03-13 ASSESSMENT — VISUAL ACUITY
OD_BCVA: 20/40+2
OS_BCVA: 20/30-2

## 2023-06-27 ENCOUNTER — TRANSCRIPTION ENCOUNTER (OUTPATIENT)
Age: 78
End: 2023-06-27

## 2023-07-10 ENCOUNTER — OFFICE (OUTPATIENT)
Dept: URBAN - METROPOLITAN AREA CLINIC 103 | Facility: CLINIC | Age: 78
Setting detail: OPHTHALMOLOGY
End: 2023-07-10
Payer: MEDICARE

## 2023-07-10 DIAGNOSIS — H25.12: ICD-10-CM

## 2023-07-10 DIAGNOSIS — H40.033: ICD-10-CM

## 2023-07-10 DIAGNOSIS — H25.13: ICD-10-CM

## 2023-07-10 PROBLEM — H25.11 CATARACT SENILE NUCLEAR SCLEROSIS; RIGHT EYE, LEFT EYE, BOTH EYES: Status: ACTIVE | Noted: 2023-07-10

## 2023-07-10 PROCEDURE — 99214 OFFICE O/P EST MOD 30 MIN: CPT | Performed by: OPHTHALMOLOGY

## 2023-07-10 PROCEDURE — 92083 EXTENDED VISUAL FIELD XM: CPT | Performed by: OPHTHALMOLOGY

## 2023-07-10 PROCEDURE — 92136 OPHTHALMIC BIOMETRY: CPT | Performed by: OPHTHALMOLOGY

## 2023-07-10 PROCEDURE — 92133 CPTRZD OPH DX IMG PST SGM ON: CPT | Performed by: OPHTHALMOLOGY

## 2023-07-10 ASSESSMENT — KERATOMETRY
OS_K1K2_AVERAGE: 44.25
OD_AXISANGLE2_DEGREES: 172
OD_AXISANGLE_DEGREES: 172
OD_K2POWER_DIOPTERS: 43.75
OS_AXISANGLE_DEGREES: 128
OS_K1POWER_DIOPTERS: 44.00
OS_K1POWER_DIOPTERS: 44.00
OD_K1K2_AVERAGE: 43.625
OS_K2POWER_DIOPTERS: 44.50
OD_AXISANGLE_DEGREES: 82
OD_CYLAXISANGLE_DEGREES: 172
OS_AXISANGLE_DEGREES: 038
OD_K1POWER_DIOPTERS: 43.50
OS_CYLAXISANGLE_DEGREES: 038
OD_K1POWER_DIOPTERS: 43.50
OD_K2POWER_DIOPTERS: 43.75
OS_AXISANGLE2_DEGREES: 038
OS_CYLPOWER_DEGREES: 0.5
OD_CYLPOWER_DEGREES: 0.25
OS_K2POWER_DIOPTERS: 44.50
METHOD_AUTO_MANUAL: MANUAL

## 2023-07-10 ASSESSMENT — REFRACTION_MANIFEST
OS_VA1: 20/50
OS_SPHERE: +1.75
OS_VA1: 20/20
OU_VA: 20/15
OS_ADD: +2.25
OS_SPHERE: -0.25
OD_AXIS: 085
OD_ADD: +2.25
OS_CYLINDER: -1.75
OS_AXIS: 100
OD_CYLINDER: -1.50
OD_SPHERE: -0.75
OD_AXIS: 089
OD_CYLINDER: -1.25
OS_SPHERE: +1.75
OD_CYLINDER: -1.50
OD_VA1: 20/40
OS_CYLINDER: -1.75
OS_AXIS: 113
OS_VA1: 20/25-1
OD_SPHERE: +1.00
OS_ADD: +2.25
OD_ADD: +2.25
OU_VA: 20/20
OD_VA1: 20/25
OD_SPHERE: +1.00
OS_AXIS: 105
OD_AXIS: 85
OD_VA1: 20/15-3
OS_CYLINDER: -1.50

## 2023-07-10 ASSESSMENT — SPHEQUIV_DERIVED
OD_SPHEQUIV: -1.5
OS_SPHEQUIV: -1
OD_SPHEQUIV: 0.25
OS_SPHEQUIV: 0.875
OS_SPHEQUIV: -1
OS_SPHEQUIV: 0.875
OD_SPHEQUIV: -1.5
OD_SPHEQUIV: 0.375

## 2023-07-10 ASSESSMENT — REFRACTION_CURRENTRX
OD_OVR_VA: 20/
OS_VPRISM_DIRECTION: SV
OD_SPHERE: +2.75
OS_OVR_VA: 20/
OD_VPRISM_DIRECTION: SV
OS_SPHERE: +3.75
OS_AXIS: 104
OD_CYLINDER: -1.25
OS_CYLINDER: -1.50
OD_AXIS: 086

## 2023-07-10 ASSESSMENT — PACHYMETRY
OD_CT_CORRECTION: 3
OD_CT_UM: 506
OS_CT_UM: 506
OS_CT_CORRECTION: 3

## 2023-07-10 ASSESSMENT — REFRACTION_AUTOREFRACTION
OS_CYLINDER: -1.50
OD_SPHERE: -0.75
OS_AXIS: 113
OS_SPHERE: -0.25
OD_CYLINDER: -1.50
OD_AXIS: 089

## 2023-07-10 ASSESSMENT — TONOMETRY
OD_IOP_MMHG: 13
OS_IOP_MMHG: 14

## 2023-07-10 ASSESSMENT — AXIALLENGTH_DERIVED
OD_AL: 23.4014
OS_AL: 23.7066
OS_AL: 22.9912
OD_AL: 24.143
OS_AL: 22.9912
OD_AL: 24.143
OD_AL: 23.4495
OS_AL: 23.7066

## 2023-07-10 ASSESSMENT — VISUAL ACUITY
OD_BCVA: 20/50
OS_BCVA: 20/40

## 2023-07-10 ASSESSMENT — CONFRONTATIONAL VISUAL FIELD TEST (CVF)
OD_FINDINGS: FULL
OS_FINDINGS: FULL

## 2023-08-01 ENCOUNTER — NON-APPOINTMENT (OUTPATIENT)
Age: 78
End: 2023-08-01

## 2023-08-15 ENCOUNTER — LABORATORY RESULT (OUTPATIENT)
Age: 78
End: 2023-08-15

## 2023-08-15 ENCOUNTER — NON-APPOINTMENT (OUTPATIENT)
Age: 78
End: 2023-08-15

## 2023-08-15 ENCOUNTER — APPOINTMENT (OUTPATIENT)
Dept: FAMILY MEDICINE | Facility: CLINIC | Age: 78
End: 2023-08-15
Payer: MEDICARE

## 2023-08-15 VITALS
HEART RATE: 56 BPM | HEIGHT: 62 IN | DIASTOLIC BLOOD PRESSURE: 68 MMHG | TEMPERATURE: 98.3 F | WEIGHT: 122 LBS | OXYGEN SATURATION: 97 % | RESPIRATION RATE: 14 BRPM | SYSTOLIC BLOOD PRESSURE: 120 MMHG | BODY MASS INDEX: 22.45 KG/M2

## 2023-08-15 DIAGNOSIS — E56.9 VITAMIN DEFICIENCY, UNSPECIFIED: ICD-10-CM

## 2023-08-15 DIAGNOSIS — E04.1 NONTOXIC SINGLE THYROID NODULE: ICD-10-CM

## 2023-08-15 DIAGNOSIS — Z01.818 ENCOUNTER FOR OTHER PREPROCEDURAL EXAMINATION: ICD-10-CM

## 2023-08-15 PROCEDURE — 93000 ELECTROCARDIOGRAM COMPLETE: CPT

## 2023-08-15 PROCEDURE — 99212 OFFICE O/P EST SF 10 MIN: CPT | Mod: 25

## 2023-08-15 NOTE — PHYSICAL EXAM
[No Acute Distress] : no acute distress [Normal Sclera/Conjunctiva] : normal sclera/conjunctiva [Normal Outer Ear/Nose] : the outer ears and nose were normal in appearance [No JVD] : no jugular venous distention [No Respiratory Distress] : no respiratory distress  [No Accessory Muscle Use] : no accessory muscle use [Clear to Auscultation] : lungs were clear to auscultation bilaterally [Normal Rate] : normal rate  [Regular Rhythm] : with a regular rhythm [Normal S1, S2] : normal S1 and S2 [No Murmur] : no murmur heard

## 2023-08-16 DIAGNOSIS — R31.21 ASYMPTOMATIC MICROSCOPIC HEMATURIA: ICD-10-CM

## 2023-08-16 LAB
25(OH)D3 SERPL-MCNC: 66.3 NG/ML
ALBUMIN SERPL ELPH-MCNC: 4.7 G/DL
ALP BLD-CCNC: 63 U/L
ALT SERPL-CCNC: 13 U/L
ANION GAP SERPL CALC-SCNC: 11 MMOL/L
APPEARANCE: CLEAR
AST SERPL-CCNC: 18 U/L
BILIRUB SERPL-MCNC: 0.5 MG/DL
BILIRUBIN URINE: NEGATIVE
BLOOD URINE: ABNORMAL
BUN SERPL-MCNC: 16 MG/DL
CALCIUM SERPL-MCNC: 9.9 MG/DL
CHLORIDE SERPL-SCNC: 100 MMOL/L
CHOLEST SERPL-MCNC: 236 MG/DL
CO2 SERPL-SCNC: 28 MMOL/L
COLOR: YELLOW
CREAT SERPL-MCNC: 1.01 MG/DL
EGFR: 57 ML/MIN/1.73M2
ESTIMATED AVERAGE GLUCOSE: 120 MG/DL
FOLATE SERPL-MCNC: 15 NG/ML
GLUCOSE QUALITATIVE U: NEGATIVE MG/DL
GLUCOSE SERPL-MCNC: 99 MG/DL
HBA1C MFR BLD HPLC: 5.8 %
HDLC SERPL-MCNC: 80 MG/DL
KETONES URINE: NEGATIVE MG/DL
LDLC SERPL CALC-MCNC: 144 MG/DL
LEUKOCYTE ESTERASE URINE: NEGATIVE
NITRITE URINE: NEGATIVE
NONHDLC SERPL-MCNC: 156 MG/DL
PH URINE: 7.5
POTASSIUM SERPL-SCNC: 5.3 MMOL/L
PROT SERPL-MCNC: 7.1 G/DL
PROTEIN URINE: NEGATIVE MG/DL
SODIUM SERPL-SCNC: 139 MMOL/L
SPECIFIC GRAVITY URINE: 1.01
T3FREE SERPL-MCNC: 2.99 PG/ML
T4 FREE SERPL-MCNC: 1.2 NG/DL
THYROGLOB AB SERPL-ACNC: <20 IU/ML
THYROPEROXIDASE AB SERPL IA-ACNC: <10 IU/ML
TRIGL SERPL-MCNC: 68 MG/DL
TSH SERPL-ACNC: 3.35 UIU/ML
UROBILINOGEN URINE: 0.2 MG/DL
VIT B12 SERPL-MCNC: 992 PG/ML

## 2023-08-16 NOTE — ASSESSMENT
[Patient Optimized for Surgery] : Patient optimized for surgery [No Further Testing Recommended] : no further testing recommended [Modify medications prior to procedure] : Modify medications prior to procedure [As per surgery] : as per surgery [FreeTextEntry4] : Patient is low risk and medically cleared for planned procedure on  8/22/23 and 9/6/23 presurgical EKG reviewed  Denies history of Myocardial infraction, CVA and ESPINAL  May continue to take prescribed medications as directed.  NPO after midnight prior to surgery  may take morning medications with sips of water morning of procedure  Advised not to take ASA and or NSAIDs prior  to surgery

## 2023-08-16 NOTE — HISTORY OF PRESENT ILLNESS
[No Pertinent Cardiac History] : no history of aortic stenosis, atrial fibrillation, coronary artery disease, recent myocardial infarction, or implantable device/pacemaker [No Pertinent Pulmonary History] : no history of asthma, COPD, sleep apnea, or smoking [No Adverse Anesthesia Reaction] : no adverse anesthesia reaction in self or family member [(Patient denies any chest pain, claudication, dyspnea on exertion, orthopnea, palpitations or syncope)] : Patient denies any chest pain, claudication, dyspnea on exertion, orthopnea, palpitations or syncope [Chronic Anticoagulation] : no chronic anticoagulation [Chronic Kidney Disease] : no chronic kidney disease [Diabetes] : no diabetes [FreeTextEntry1] : left and right eye cataract surgery  [FreeTextEntry2] : 8/22/23 and 9/6/23 [FreeTextEntry3] : Kedar MARTINEZ  [FreeTextEntry4] : 78 year old female present for her pre-opt surgery. She has no acute medical complaints at this time. Denies changes in vision, dizziness, chest pain, palpitations and lower extremity edema.

## 2023-08-18 ENCOUNTER — APPOINTMENT (OUTPATIENT)
Dept: FAMILY MEDICINE | Facility: CLINIC | Age: 78
End: 2023-08-18
Payer: MEDICARE

## 2023-08-18 ENCOUNTER — LABORATORY RESULT (OUTPATIENT)
Age: 78
End: 2023-08-18

## 2023-08-18 LAB — TSI ACT/NOR SER: <0.1 IU/L

## 2023-08-18 PROCEDURE — 81003 URINALYSIS AUTO W/O SCOPE: CPT | Mod: QW

## 2023-08-21 DIAGNOSIS — I10 ESSENTIAL (PRIMARY) HYPERTENSION: ICD-10-CM

## 2023-08-21 LAB
APPEARANCE: CLEAR
BILIRUBIN URINE: NEGATIVE
BLOOD URINE: ABNORMAL
COLOR: YELLOW
GLUCOSE QUALITATIVE U: NEGATIVE MG/DL
KETONES URINE: NEGATIVE MG/DL
LEUKOCYTE ESTERASE URINE: ABNORMAL
NITRITE URINE: NEGATIVE
PH URINE: 7
PROTEIN URINE: NEGATIVE MG/DL
SPECIFIC GRAVITY URINE: 1.01
URINE CYTOLOGY: NORMAL
UROBILINOGEN URINE: 0.2 MG/DL

## 2023-08-22 ENCOUNTER — ASC (OUTPATIENT)
Dept: URBAN - METROPOLITAN AREA SURGERY 8 | Facility: SURGERY | Age: 78
Setting detail: OPHTHALMOLOGY
End: 2023-08-22
Payer: MEDICARE

## 2023-08-22 DIAGNOSIS — H52.212: ICD-10-CM

## 2023-08-22 DIAGNOSIS — H25.12: ICD-10-CM

## 2023-08-22 LAB — HEMOCCULT STL QL IA: NEGATIVE

## 2023-08-22 PROCEDURE — 66984 XCAPSL CTRC RMVL W/O ECP: CPT | Performed by: OPHTHALMOLOGY

## 2023-08-22 PROCEDURE — FEMTO FEMTOSECOND LASER: Performed by: OPHTHALMOLOGY

## 2023-08-23 ENCOUNTER — RX ONLY (RX ONLY)
Age: 78
End: 2023-08-23

## 2023-08-23 ENCOUNTER — OFFICE (OUTPATIENT)
Dept: URBAN - METROPOLITAN AREA CLINIC 105 | Facility: CLINIC | Age: 78
Setting detail: OPHTHALMOLOGY
End: 2023-08-23
Payer: MEDICARE

## 2023-08-23 DIAGNOSIS — Z96.1: ICD-10-CM

## 2023-08-23 PROCEDURE — 99024 POSTOP FOLLOW-UP VISIT: CPT | Performed by: REGISTERED NURSE

## 2023-08-23 ASSESSMENT — REFRACTION_MANIFEST
OS_VA1: 20/25-1
OS_VA1: 20/20
OD_AXIS: 85
OS_VA1: 20/50
OS_AXIS: 100
OS_SPHERE: +1.75
OD_ADD: +2.25
OD_CYLINDER: -1.25
OD_VA1: 20/25
OU_VA: 20/20
OS_ADD: +2.25
OS_AXIS: 105
OD_VA1: 20/40
OS_AXIS: 113
OD_SPHERE: +1.00
OS_CYLINDER: -1.50
OS_CYLINDER: -1.75
OS_CYLINDER: -1.75
OS_SPHERE: -0.25
OU_VA: 20/15
OD_CYLINDER: -1.50
OD_SPHERE: -0.75
OD_CYLINDER: -1.50
OS_SPHERE: +1.75
OD_VA1: 20/15-3
OD_AXIS: 089
OD_SPHERE: +1.00
OS_ADD: +2.25
OD_AXIS: 085
OD_ADD: +2.25

## 2023-08-23 ASSESSMENT — AXIALLENGTH_DERIVED
OD_AL: 23.9522
OS_AL: 22.8181
OS_AL: 16.49
OS_AL: 23.5226
OS_AL: 22.8181
OD_AL: 23.2221
OD_AL: 23.8025
OD_AL: 23.2694

## 2023-08-23 ASSESSMENT — REFRACTION_CURRENTRX
OS_SPHERE: +3.75
OS_OVR_VA: 20/
OS_VPRISM_DIRECTION: SV
OD_VPRISM_DIRECTION: SV
OD_OVR_VA: 20/
OD_SPHERE: +2.75
OS_AXIS: 104
OS_CYLINDER: -1.50
OD_AXIS: 086
OD_CYLINDER: -1.25

## 2023-08-23 ASSESSMENT — SPHEQUIV_DERIVED
OS_SPHEQUIV: 0.875
OS_SPHEQUIV: 0.875
OD_SPHEQUIV: -1.5
OD_SPHEQUIV: 0.375
OS_SPHEQUIV: 24.875
OD_SPHEQUIV: -1.125
OD_SPHEQUIV: 0.25
OS_SPHEQUIV: -1

## 2023-08-23 ASSESSMENT — KERATOMETRY
METHOD_AUTO_MANUAL: MANUAL
OD_K1POWER_DIOPTERS: 43.75
OS_K2POWER_DIOPTERS: 45.00
OS_AXISANGLE_DEGREES: 046
OS_K1POWER_DIOPTERS: 44.50
OD_AXISANGLE_DEGREES: 176
OD_K2POWER_DIOPTERS: 44.50

## 2023-08-23 ASSESSMENT — CONFRONTATIONAL VISUAL FIELD TEST (CVF)
OS_FINDINGS: FULL
OD_FINDINGS: FULL

## 2023-08-23 ASSESSMENT — PACHYMETRY
OS_CT_CORRECTION: 3
OS_CT_UM: 506
OD_CT_UM: 506
OD_CT_CORRECTION: 3

## 2023-08-23 ASSESSMENT — CORNEAL EDEMA - FOLDS/STRIAE: OS_FOLDSSTRIAE: 3+

## 2023-08-23 ASSESSMENT — CORNEAL EDEMA - MICROCYSTIC EPITHELIAL EDEMA (MCE): OS_MCE: 3+

## 2023-08-23 ASSESSMENT — REFRACTION_AUTOREFRACTION
OS_SPHERE: +25.00
OD_AXIS: 092
OD_SPHERE: -0.25
OS_AXIS: 022
OD_CYLINDER: -1.75
OS_CYLINDER: -0.25

## 2023-08-23 ASSESSMENT — VISUAL ACUITY
OS_BCVA: 20/50-
OD_BCVA: 20/150-

## 2023-08-23 ASSESSMENT — TONOMETRY
OS_IOP_MMHG: 15
OD_IOP_MMHG: 15

## 2023-08-30 ENCOUNTER — OFFICE (OUTPATIENT)
Dept: URBAN - METROPOLITAN AREA CLINIC 105 | Facility: CLINIC | Age: 78
Setting detail: OPHTHALMOLOGY
End: 2023-08-30
Payer: MEDICARE

## 2023-08-30 DIAGNOSIS — H25.11: ICD-10-CM

## 2023-08-30 DIAGNOSIS — Z96.1: ICD-10-CM

## 2023-08-30 PROCEDURE — 92136 OPHTHALMIC BIOMETRY: CPT | Performed by: REGISTERED NURSE

## 2023-08-30 PROCEDURE — 99024 POSTOP FOLLOW-UP VISIT: CPT | Performed by: REGISTERED NURSE

## 2023-08-30 ASSESSMENT — TONOMETRY
OD_IOP_MMHG: 16
OS_IOP_MMHG: 14

## 2023-08-30 ASSESSMENT — REFRACTION_MANIFEST
OD_VA1: 20/40
OD_AXIS: 089
OD_AXIS: 085
OD_SPHERE: +1.00
OD_ADD: +2.25
OS_SPHERE: -0.25
OS_VA1: 20/50
OS_AXIS: 113
OD_CYLINDER: -1.50
OD_CYLINDER: -1.25
OD_SPHERE: +1.00
OD_ADD: +2.25
OS_VA1: 20/25-1
OS_VA1: 20/20
OS_AXIS: 105
OD_SPHERE: -0.75
OS_CYLINDER: -1.50
OS_AXIS: 060
OS_CYLINDER: -1.75
OS_VA1: 20/25
OU_VA: 20/15
OD_VA1: 20/15-3
OD_CYLINDER: -1.50
OS_ADD: +2.25
OS_SPHERE: +1.75
OS_CYLINDER: -1.00
OS_CYLINDER: -1.75
OS_ADD: +2.25
OS_SPHERE: +1.75
OS_AXIS: 100
OS_SPHERE: PLANO
OD_VA1: 20/25
OU_VA: 20/20
OD_AXIS: 85

## 2023-08-30 ASSESSMENT — PACHYMETRY
OD_CT_UM: 506
OD_CT_CORRECTION: 3
OS_CT_UM: 506
OS_CT_CORRECTION: 3

## 2023-08-30 ASSESSMENT — REFRACTION_AUTOREFRACTION
OS_AXIS: 059
OD_CYLINDER: -1.25
OD_SPHERE: 0.00
OD_AXIS: 094
OS_CYLINDER: -0.75
OS_SPHERE: +0.25

## 2023-08-30 ASSESSMENT — REFRACTION_CURRENTRX
OS_OVR_VA: 20/
OD_CYLINDER: -0.75
OD_AXIS: 094
OD_OVR_VA: 20/
OS_AXIS: 108
OD_VPRISM_DIRECTION: SV
OS_CYLINDER: -0.75
OS_SPHERE: +3.25
OS_VPRISM_DIRECTION: SV
OD_SPHERE: +3.25

## 2023-08-30 ASSESSMENT — CONFRONTATIONAL VISUAL FIELD TEST (CVF)
OS_FINDINGS: FULL
OD_FINDINGS: FULL

## 2023-08-30 ASSESSMENT — VISUAL ACUITY
OD_BCVA: 20/25
OS_BCVA: 20/40

## 2023-08-30 ASSESSMENT — AXIALLENGTH_DERIVED
OD_AL: 23.1362
OS_AL: 23.3454
OD_AL: 23.0894
OD_AL: 23.4687
OS_AL: 23.7333
OD_AL: 23.811
OS_AL: 23.3454
OS_AL: 24.0834

## 2023-08-30 ASSESSMENT — KERATOMETRY
OD_K1POWER_DIOPTERS: 44.50
OS_AXISANGLE_DEGREES: 090
OD_K2POWER_DIOPTERS: 44.50
OS_K2POWER_DIOPTERS: 43.25
OS_K1POWER_DIOPTERS: 43.25
OD_AXISANGLE_DEGREES: 090
METHOD_AUTO_MANUAL: MANUAL

## 2023-08-30 ASSESSMENT — SPHEQUIV_DERIVED
OS_SPHEQUIV: 0.875
OD_SPHEQUIV: 0.25
OS_SPHEQUIV: 0.875
OD_SPHEQUIV: 0.375
OS_SPHEQUIV: -0.125
OD_SPHEQUIV: -1.5
OD_SPHEQUIV: -0.625
OS_SPHEQUIV: -1

## 2023-08-30 ASSESSMENT — CORNEAL EDEMA - MICROCYSTIC EPITHELIAL EDEMA (MCE): OS_MCE: 3+

## 2023-08-30 ASSESSMENT — CORNEAL EDEMA - FOLDS/STRIAE: OS_FOLDSSTRIAE: 3+

## 2023-09-06 ENCOUNTER — ASC (OUTPATIENT)
Dept: URBAN - METROPOLITAN AREA SURGERY 8 | Facility: SURGERY | Age: 78
Setting detail: OPHTHALMOLOGY
End: 2023-09-06
Payer: MEDICARE

## 2023-09-06 DIAGNOSIS — H52.211: ICD-10-CM

## 2023-09-06 DIAGNOSIS — H25.11: ICD-10-CM

## 2023-09-06 PROCEDURE — 66984 XCAPSL CTRC RMVL W/O ECP: CPT | Performed by: OPHTHALMOLOGY

## 2023-09-06 PROCEDURE — FEMTO FEMTOSECOND LASER: Performed by: OPHTHALMOLOGY

## 2023-09-07 ENCOUNTER — RX ONLY (RX ONLY)
Age: 78
End: 2023-09-07

## 2023-09-07 ENCOUNTER — OFFICE (OUTPATIENT)
Dept: URBAN - METROPOLITAN AREA CLINIC 105 | Facility: CLINIC | Age: 78
Setting detail: OPHTHALMOLOGY
End: 2023-09-07
Payer: MEDICARE

## 2023-09-07 DIAGNOSIS — Z96.1: ICD-10-CM

## 2023-09-07 PROCEDURE — 99024 POSTOP FOLLOW-UP VISIT: CPT | Performed by: REGISTERED NURSE

## 2023-09-07 ASSESSMENT — REFRACTION_CURRENTRX
OD_VPRISM_DIRECTION: SV
OS_AXIS: 108
OS_VPRISM_DIRECTION: SV
OD_OVR_VA: 20/
OD_CYLINDER: -0.75
OD_AXIS: 094
OD_SPHERE: +3.25
OS_SPHERE: +3.25
OS_OVR_VA: 20/
OS_CYLINDER: -0.75

## 2023-09-07 ASSESSMENT — KERATOMETRY
OD_AXISANGLE_DEGREES: 090
OS_AXISANGLE_DEGREES: 090
OD_K1POWER_DIOPTERS: 45.00
OD_K2POWER_DIOPTERS: 45.00
METHOD_AUTO_MANUAL: MANUAL
OS_K2POWER_DIOPTERS: 43.50
OS_K1POWER_DIOPTERS: 43.50

## 2023-09-07 ASSESSMENT — PACHYMETRY
OD_CT_UM: 506
OD_CT_CORRECTION: 3
OS_CT_UM: 506
OS_CT_CORRECTION: 3

## 2023-09-07 ASSESSMENT — SPHEQUIV_DERIVED
OS_SPHEQUIV: 0.875
OS_SPHEQUIV: -1
OD_SPHEQUIV: 0.375
OS_SPHEQUIV: -0.125
OD_SPHEQUIV: -3
OS_SPHEQUIV: 0.875
OD_SPHEQUIV: 0.25
OD_SPHEQUIV: -1.5

## 2023-09-07 ASSESSMENT — REFRACTION_MANIFEST
OS_CYLINDER: -1.75
OD_ADD: +2.25
OS_CYLINDER: -1.50
OD_VA1: 20/25
OD_VA1: 20/15-3
OD_CYLINDER: -1.25
OS_SPHERE: +1.75
OD_AXIS: 85
OS_CYLINDER: -1.00
OD_SPHERE: +1.00
OS_AXIS: 105
OS_AXIS: 060
OD_VA1: 20/40
OD_ADD: +2.25
OS_AXIS: 100
OS_SPHERE: -0.25
OD_CYLINDER: -1.50
OD_CYLINDER: -1.50
OD_SPHERE: -0.75
OS_SPHERE: PLANO
OU_VA: 20/20
OS_VA1: 20/50
OS_VA1: 20/25
OU_VA: 20/15
OS_VA1: 20/20
OS_VA1: 20/25-1
OS_SPHERE: +1.75
OD_AXIS: 085
OS_AXIS: 113
OD_AXIS: 089
OS_ADD: +2.25
OS_ADD: +2.25
OD_SPHERE: +1.00
OS_CYLINDER: -1.75

## 2023-09-07 ASSESSMENT — REFRACTION_AUTOREFRACTION
OS_CYLINDER: -0.75
OS_AXIS: 068
OD_CYLINDER: -1.50
OS_SPHERE: +0.25
OD_AXIS: 174
OD_SPHERE: -2.25

## 2023-09-07 ASSESSMENT — VISUAL ACUITY
OD_BCVA: 20/20
OS_BCVA: 20/80

## 2023-09-07 ASSESSMENT — AXIALLENGTH_DERIVED
OS_AL: 23.9881
OS_AL: 23.2559
OS_AL: 23.2559
OD_AL: 23.6254
OD_AL: 22.9609
OD_AL: 24.2264
OD_AL: 22.9148
OS_AL: 23.6407

## 2023-09-07 ASSESSMENT — CONFRONTATIONAL VISUAL FIELD TEST (CVF)
OS_FINDINGS: FULL
OD_FINDINGS: FULL

## 2023-09-07 ASSESSMENT — CORNEAL EDEMA CLINICAL DESCRIPTION: OD_CORNEALEDEMA: 3+

## 2023-09-07 ASSESSMENT — CORNEAL EDEMA - FOLDS/STRIAE: OD_FOLDSSTRIAE: 3+

## 2023-09-07 ASSESSMENT — TONOMETRY
OD_IOP_MMHG: 16
OS_IOP_MMHG: 14

## 2023-09-13 PROBLEM — Z80.3 FAMILY HISTORY OF MALIGNANT NEOPLASM OF FEMALE BREAST: Status: ACTIVE | Noted: 2023-09-13

## 2023-09-13 PROBLEM — Z86.39 HISTORY OF HIGH CHOLESTEROL: Status: RESOLVED | Noted: 2023-09-13 | Resolved: 2023-09-13

## 2023-09-13 PROBLEM — Z83.49 FAMILY HISTORY OF OBESITY: Status: ACTIVE | Noted: 2023-09-13

## 2023-09-13 PROBLEM — Z82.49 FAMILY HISTORY OF HYPERTENSION: Status: ACTIVE | Noted: 2023-09-13

## 2023-09-13 PROBLEM — R35.1 NOCTURIA: Status: ACTIVE | Noted: 2023-09-13

## 2023-09-13 PROBLEM — Z84.1 FAMILY HISTORY OF KIDNEY DISEASE: Status: ACTIVE | Noted: 2023-09-13

## 2023-09-13 PROBLEM — Z86.69 HISTORY OF GLAUCOMA: Status: RESOLVED | Noted: 2023-09-13 | Resolved: 2023-09-13

## 2023-09-13 PROBLEM — Z78.9 SOCIAL ALCOHOL USE: Status: ACTIVE | Noted: 2023-09-13

## 2023-09-13 PROBLEM — R39.198 DIFFICULTY URINATING: Status: ACTIVE | Noted: 2023-09-13

## 2023-09-13 PROBLEM — Z82.49 FAMILY HISTORY OF CARDIAC DISORDER: Status: ACTIVE | Noted: 2023-09-13

## 2023-09-13 PROBLEM — R39.13 INTERMITTENT URINARY STREAM: Status: ACTIVE | Noted: 2023-09-13

## 2023-09-13 PROBLEM — R39.15 URINARY URGENCY: Status: ACTIVE | Noted: 2023-09-13

## 2023-09-14 ENCOUNTER — OFFICE (OUTPATIENT)
Dept: URBAN - METROPOLITAN AREA CLINIC 105 | Facility: CLINIC | Age: 78
Setting detail: OPHTHALMOLOGY
End: 2023-09-14
Payer: MEDICARE

## 2023-09-14 DIAGNOSIS — H16.221: ICD-10-CM

## 2023-09-14 DIAGNOSIS — Z96.1: ICD-10-CM

## 2023-09-14 PROCEDURE — 99024 POSTOP FOLLOW-UP VISIT: CPT | Performed by: REGISTERED NURSE

## 2023-09-14 ASSESSMENT — KERATOMETRY
OS_K1POWER_DIOPTERS: 43.25
OD_AXISANGLE_DEGREES: 078
OS_K2POWER_DIOPTERS: 43.50
OS_AXISANGLE_DEGREES: 096
OD_K1POWER_DIOPTERS: 43.75
OD_K2POWER_DIOPTERS: 44.00
METHOD_AUTO_MANUAL: MANUAL

## 2023-09-14 ASSESSMENT — REFRACTION_CURRENTRX
OD_OVR_VA: 20/
OD_VPRISM_DIRECTION: SV
OS_CYLINDER: -0.75
OS_VPRISM_DIRECTION: SV
OS_SPHERE: +3.25
OS_AXIS: 108
OD_CYLINDER: -0.75
OD_AXIS: 094
OD_SPHERE: +3.25
OS_OVR_VA: 20/

## 2023-09-14 ASSESSMENT — REFRACTION_MANIFEST
OD_VA1: 20/25
OD_SPHERE: +1.00
OD_VA1: 20/15-3
OS_AXIS: 100
OS_VA1: 20/25
OD_CYLINDER: -1.25
OD_CYLINDER: -1.50
OS_ADD: +2.25
OS_AXIS: 060
OD_ADD: +2.25
OS_CYLINDER: -1.75
OS_AXIS: 113
OU_VA: 20/20
OD_CYLINDER: -1.50
OS_CYLINDER: -1.50
OS_ADD: +2.25
OD_AXIS: 085
OS_SPHERE: +1.75
OS_CYLINDER: -1.75
OS_SPHERE: +1.75
OD_VA1: 20/40
OS_VA1: 20/20
OD_SPHERE: -0.75
OD_SPHERE: +1.00
OS_VA1: 20/25-1
OS_AXIS: 105
OD_AXIS: 85
OS_CYLINDER: -1.00
OS_SPHERE: -0.25
OD_ADD: +2.25
OS_VA1: 20/50
OD_AXIS: 089
OU_VA: 20/15
OS_SPHERE: PLANO

## 2023-09-14 ASSESSMENT — SPHEQUIV_DERIVED
OS_SPHEQUIV: -0.375
OS_SPHEQUIV: 0.875
OD_SPHEQUIV: -1
OD_SPHEQUIV: 0.375
OD_SPHEQUIV: 0.25
OD_SPHEQUIV: -1.5
OS_SPHEQUIV: 0.875
OS_SPHEQUIV: -1

## 2023-09-14 ASSESSMENT — AXIALLENGTH_DERIVED
OD_AL: 23.3114
OS_AL: 23.3006
OS_AL: 24.0356
OS_AL: 23.3006
OD_AL: 24.0472
OS_AL: 23.7855
OD_AL: 23.3591
OD_AL: 23.8465

## 2023-09-14 ASSESSMENT — CONFRONTATIONAL VISUAL FIELD TEST (CVF)
OD_FINDINGS: FULL
OS_FINDINGS: FULL

## 2023-09-14 ASSESSMENT — SUPERFICIAL PUNCTATE KERATITIS (SPK): OD_SPK: T

## 2023-09-14 ASSESSMENT — REFRACTION_AUTOREFRACTION
OS_SPHERE: 0.00
OD_SPHERE: -0.75
OS_AXIS: 084
OD_AXIS: 121
OD_CYLINDER: -0.50
OS_CYLINDER: -0.75

## 2023-09-14 ASSESSMENT — TONOMETRY
OS_IOP_MMHG: 14
OD_IOP_MMHG: 13

## 2023-09-14 ASSESSMENT — PACHYMETRY
OD_CT_UM: 506
OS_CT_UM: 506
OD_CT_CORRECTION: 3
OS_CT_CORRECTION: 3

## 2023-09-14 ASSESSMENT — VISUAL ACUITY
OD_BCVA: 20/20-2
OS_BCVA: 20/30-2

## 2023-09-20 ENCOUNTER — APPOINTMENT (OUTPATIENT)
Dept: UROGYNECOLOGY | Facility: CLINIC | Age: 78
End: 2023-09-20
Payer: MEDICARE

## 2023-09-20 VITALS
HEIGHT: 62 IN | BODY MASS INDEX: 22.45 KG/M2 | SYSTOLIC BLOOD PRESSURE: 180 MMHG | DIASTOLIC BLOOD PRESSURE: 60 MMHG | WEIGHT: 122 LBS

## 2023-09-20 DIAGNOSIS — Z86.39 PERSONAL HISTORY OF OTHER ENDOCRINE, NUTRITIONAL AND METABOLIC DISEASE: ICD-10-CM

## 2023-09-20 DIAGNOSIS — Z83.49 FAMILY HISTORY OF OTHER ENDOCRINE, NUTRITIONAL AND METABOLIC DISEASES: ICD-10-CM

## 2023-09-20 DIAGNOSIS — Z84.1 FAMILY HISTORY OF DISORDERS OF KIDNEY AND URETER: ICD-10-CM

## 2023-09-20 DIAGNOSIS — Z78.9 OTHER SPECIFIED HEALTH STATUS: ICD-10-CM

## 2023-09-20 DIAGNOSIS — Z82.49 FAMILY HISTORY OF ISCHEMIC HEART DISEASE AND OTHER DISEASES OF THE CIRCULATORY SYSTEM: ICD-10-CM

## 2023-09-20 DIAGNOSIS — Z80.3 FAMILY HISTORY OF MALIGNANT NEOPLASM OF BREAST: ICD-10-CM

## 2023-09-20 DIAGNOSIS — R35.1 NOCTURIA: ICD-10-CM

## 2023-09-20 DIAGNOSIS — Z86.69 PERSONAL HISTORY OF OTHER DISEASES OF THE NERVOUS SYSTEM AND SENSE ORGANS: ICD-10-CM

## 2023-09-20 DIAGNOSIS — R39.15 URGENCY OF URINATION: ICD-10-CM

## 2023-09-20 DIAGNOSIS — R39.198 OTHER DIFFICULTIES WITH MICTURITION: ICD-10-CM

## 2023-09-20 DIAGNOSIS — R39.13 SPLITTING OF URINARY STREAM: ICD-10-CM

## 2023-09-20 PROCEDURE — 81003 URINALYSIS AUTO W/O SCOPE: CPT | Mod: QW

## 2023-09-20 PROCEDURE — 51701 INSERT BLADDER CATHETER: CPT | Mod: 59

## 2023-09-20 PROCEDURE — 99204 OFFICE O/P NEW MOD 45 MIN: CPT | Mod: 25

## 2023-09-21 LAB
APPEARANCE: CLEAR
BACTERIA: NEGATIVE /HPF
BILIRUBIN URINE: NEGATIVE
BLOOD URINE: ABNORMAL
CAST: 0 /LPF
COLOR: YELLOW
EPITHELIAL CELLS: 0 /HPF
GLUCOSE QUALITATIVE U: NEGATIVE MG/DL
KETONES URINE: NEGATIVE MG/DL
LEUKOCYTE ESTERASE URINE: NEGATIVE
MICROSCOPIC-UA: NORMAL
NITRITE URINE: NEGATIVE
PH URINE: 8
PROTEIN URINE: NEGATIVE MG/DL
RED BLOOD CELLS URINE: 0 /HPF
REVIEW: NORMAL
SPECIFIC GRAVITY URINE: 1
UROBILINOGEN URINE: 0.2 MG/DL
WHITE BLOOD CELLS URINE: 0 /HPF

## 2023-09-23 LAB — BACTERIA UR CULT: NORMAL

## 2023-09-25 PROBLEM — H25.13 CATARACT SENILE NUCLEAR SCLEROSIS; BOTH EYES: Status: ACTIVE | Noted: 2023-09-25

## 2023-10-09 ENCOUNTER — OFFICE (OUTPATIENT)
Dept: URBAN - METROPOLITAN AREA CLINIC 103 | Facility: CLINIC | Age: 78
Setting detail: OPHTHALMOLOGY
End: 2023-10-09
Payer: MEDICARE

## 2023-10-09 DIAGNOSIS — Z96.1: ICD-10-CM

## 2023-10-09 DIAGNOSIS — H16.221: ICD-10-CM

## 2023-10-09 DIAGNOSIS — H40.033: ICD-10-CM

## 2023-10-09 PROCEDURE — 99024 POSTOP FOLLOW-UP VISIT: CPT | Performed by: OPHTHALMOLOGY

## 2023-10-09 ASSESSMENT — AXIALLENGTH_DERIVED
OS_AL: 23.9407
OD_AL: 23.2667
OD_AL: 23.3142
OD_AL: 23.9996
OS_AL: 23.2113
OS_AL: 23.2113

## 2023-10-09 ASSESSMENT — REFRACTION_MANIFEST
OS_VA1: 20/25-1
OD_AXIS: 089
OD_ADD: +2.25
OU_VA: 20/20
OU_VA: 20/15
OS_CYLINDER: -1.50
OD_AXIS: 85
OS_CYLINDER: -1.75
OD_CYLINDER: -1.50
OD_ADD: +2.25
OS_AXIS: 113
OD_AXIS: 085
OS_AXIS: 105
OS_VA1: 20/25
OS_VA1: 20/50
OD_SPHERE: +1.00
OS_SPHERE: -0.25
OS_AXIS: 060
OS_CYLINDER: -1.75
OS_ADD: +2.25
OS_CYLINDER: -1.00
OS_SPHERE: +1.75
OS_VA1: 20/20
OD_CYLINDER: -1.50
OD_VA1: 20/15-3
OS_AXIS: 100
OD_VA1: 20/40
OS_ADD: +2.25
OD_SPHERE: +1.00
OD_CYLINDER: -1.25
OD_SPHERE: -0.75
OS_SPHERE: PLANO
OS_SPHERE: +1.75
OD_VA1: 20/25

## 2023-10-09 ASSESSMENT — SPHEQUIV_DERIVED
OS_SPHEQUIV: 0.875
OD_SPHEQUIV: -1.5
OD_SPHEQUIV: 0.25
OD_SPHEQUIV: 0.375
OS_SPHEQUIV: -1
OS_SPHEQUIV: 0.875

## 2023-10-09 ASSESSMENT — PACHYMETRY
OD_CT_UM: 506
OS_CT_UM: 506
OS_CT_CORRECTION: 3
OD_CT_CORRECTION: 3

## 2023-10-09 ASSESSMENT — KERATOMETRY
OD_K1POWER_DIOPTERS: 43.75
OD_AXISANGLE_DEGREES: 163
OS_K2POWER_DIOPTERS: 43.75
OD_K2POWER_DIOPTERS: 44.25
METHOD_AUTO_MANUAL: MANUAL
OS_K1POWER_DIOPTERS: 43.50
OS_AXISANGLE_DEGREES: 017

## 2023-10-09 ASSESSMENT — REFRACTION_CURRENTRX
OD_CYLINDER: -0.75
OD_OVR_VA: 20/
OS_AXIS: 108
OD_SPHERE: +3.25
OD_VPRISM_DIRECTION: SV
OS_OVR_VA: 20/
OS_SPHERE: +3.25
OD_AXIS: 094
OS_VPRISM_DIRECTION: SV
OS_CYLINDER: -0.75

## 2023-10-09 ASSESSMENT — REFRACTION_AUTOREFRACTION
OD_SPHERE: -1.25
OS_AXIS: 088
OS_CYLINDER: -1.00
OS_SPHERE: SPHERE
OD_CYLINDER: SPHERE

## 2023-10-09 ASSESSMENT — TONOMETRY: OD_IOP_MMHG: 10

## 2023-10-09 ASSESSMENT — CONFRONTATIONAL VISUAL FIELD TEST (CVF)
OS_FINDINGS: FULL
OD_FINDINGS: FULL

## 2023-10-09 ASSESSMENT — SUPERFICIAL PUNCTATE KERATITIS (SPK): OD_SPK: T

## 2023-10-09 ASSESSMENT — VISUAL ACUITY
OS_BCVA: 20/30
OD_BCVA: 20/30

## 2023-10-14 DIAGNOSIS — K86.89 OTHER SPECIFIED DISEASES OF PANCREAS: ICD-10-CM

## 2023-10-14 DIAGNOSIS — R10.11 RIGHT UPPER QUADRANT PAIN: ICD-10-CM

## 2023-10-14 DIAGNOSIS — R10.12 RIGHT UPPER QUADRANT PAIN: ICD-10-CM

## 2023-10-16 DIAGNOSIS — Q45.3 OTHER CONGENITAL MALFORMATIONS OF PANCREAS AND PANCREATIC DUCT: ICD-10-CM

## 2023-10-30 ENCOUNTER — APPOINTMENT (OUTPATIENT)
Dept: UROGYNECOLOGY | Facility: CLINIC | Age: 78
End: 2023-10-30
Payer: MEDICARE

## 2023-10-30 PROCEDURE — 81003 URINALYSIS AUTO W/O SCOPE: CPT | Mod: QW

## 2023-10-30 PROCEDURE — 52000 CYSTOURETHROSCOPY: CPT

## 2023-11-06 ENCOUNTER — APPOINTMENT (OUTPATIENT)
Dept: UROGYNECOLOGY | Facility: CLINIC | Age: 78
End: 2023-11-06
Payer: MEDICARE

## 2023-11-06 DIAGNOSIS — R39.14 FEELING OF INCOMPLETE BLADDER EMPTYING: ICD-10-CM

## 2023-11-06 PROCEDURE — 51741 ELECTRO-UROFLOWMETRY FIRST: CPT

## 2023-11-06 PROCEDURE — 51729 CYSTOMETROGRAM W/VP&UP: CPT

## 2023-11-06 PROCEDURE — 51797 INTRAABDOMINAL PRESSURE TEST: CPT

## 2023-11-06 PROCEDURE — 51784 ANAL/URINARY MUSCLE STUDY: CPT

## 2023-11-08 LAB
APPEARANCE: CLEAR
BACTERIA UR CULT: NORMAL
BACTERIA: NEGATIVE /HPF
BILIRUBIN URINE: NEGATIVE
BLOOD URINE: ABNORMAL
CAST: 0 /LPF
COLOR: YELLOW
EPITHELIAL CELLS: 2 /HPF
GLUCOSE QUALITATIVE U: NEGATIVE MG/DL
KETONES URINE: NEGATIVE MG/DL
LEUKOCYTE ESTERASE URINE: NEGATIVE
MICROSCOPIC-UA: NORMAL
NITRITE URINE: NEGATIVE
PH URINE: 8
PROTEIN URINE: NEGATIVE MG/DL
RED BLOOD CELLS URINE: 0 /HPF
REVIEW: NORMAL
SPECIFIC GRAVITY URINE: 1.01
UROBILINOGEN URINE: 0.2 MG/DL
WHITE BLOOD CELLS URINE: 0 /HPF

## 2023-11-13 RX ORDER — NITROFURANTOIN (MONOHYDRATE/MACROCRYSTALS) 25; 75 MG/1; MG/1
100 CAPSULE ORAL TWICE DAILY
Qty: 14 | Refills: 0 | Status: ACTIVE | COMMUNITY
Start: 2023-11-13 | End: 1900-01-01

## 2023-12-20 ENCOUNTER — APPOINTMENT (OUTPATIENT)
Dept: UROGYNECOLOGY | Facility: CLINIC | Age: 78
End: 2023-12-20
Payer: MEDICARE

## 2023-12-20 DIAGNOSIS — R35.0 FREQUENCY OF MICTURITION: ICD-10-CM

## 2023-12-20 DIAGNOSIS — R30.0 DYSURIA: ICD-10-CM

## 2023-12-20 PROCEDURE — 81003 URINALYSIS AUTO W/O SCOPE: CPT | Mod: QW

## 2023-12-20 PROCEDURE — 99214 OFFICE O/P EST MOD 30 MIN: CPT

## 2023-12-22 LAB
APPEARANCE: CLEAR
BACTERIA UR CULT: NORMAL
BACTERIA: NEGATIVE /HPF
BILIRUBIN URINE: NEGATIVE
BLOOD URINE: NEGATIVE
CAST: 0 /LPF
COLOR: YELLOW
EPITHELIAL CELLS: 0 /HPF
GLUCOSE QUALITATIVE U: NEGATIVE MG/DL
KETONES URINE: NEGATIVE MG/DL
LEUKOCYTE ESTERASE URINE: NEGATIVE
MICROSCOPIC-UA: NORMAL
NITRITE URINE: NEGATIVE
PH URINE: 7
PROTEIN URINE: NEGATIVE MG/DL
RED BLOOD CELLS URINE: 1 /HPF
SPECIFIC GRAVITY URINE: 1.01
UROBILINOGEN URINE: 0.2 MG/DL
WHITE BLOOD CELLS URINE: 0 /HPF

## 2024-01-06 PROBLEM — R30.0 DYSURIA: Status: ACTIVE | Noted: 2021-11-30

## 2024-01-06 NOTE — ASSESSMENT
[FreeTextEntry1] : Patient is a 78-year-old multipara with concerns of blood in the urine intermittently for more than 2 years, subjective voiding dysfunction, chronic constipation and atrophic vaginitis.  Patient had 3 urine analysis since she establish care with us in September 2023.  All 3 of these urinalysis are negative for presence of RBCs.  Patient had negative cystoscopic survey.  She has normal-appearing kidneys on CAT scan and MRI.  She has significant atrophic vaginitis which may be contributing to presence of qualitative blood on urine analysis

## 2024-01-06 NOTE — HISTORY OF PRESENT ILLNESS
[FreeTextEntry1] : Patient is a 78-year-old multipara with concerns of blood in the urine intermittently for more than 2 years, subjective voiding dysfunction, chronic constipation and atrophic vaginitis. She has been using vaginal estradiol cream once a week. Not interested in trial of Myrbetriq last visit, 09/20/2023. She was treated empirically with macrobid on 11/13 for dysuria after UDS on 11/6. Here to discuss Urodynamic testing and MRI results.   Urodynamic testing 11/06/2023 - Study revealed no overactivity, normal bladder capacity, no urge incontinence and negative stress incontinence. Normal sensation, normal compliance , normal cystometric capacity, absence of stress urine incontinence, absence of detrusor overactivity and absence of voiding dysfunction.   MRI Abdomen 11/03/2023 - no discrete pancreatic mass is identified CT Abdomen and Pelvis 10/06/2023 - bilateral renal cysts Normal cysto 10/30/2023  Ua with micro 11/06/2023 - small blood Negative Urine Culture 11/06/2023

## 2024-01-06 NOTE — ADDENDUM
[FreeTextEntry1] : This note was written by Kimberley Pineda, acting as the  for Dr. Nassar. This note accurately reflects the work and decisions made by Dr. Nassar.

## 2024-01-06 NOTE — PHYSICAL EXAM
[Chaperone Present] : A chaperone was present in the examining room during all aspects of the physical examination [FreeTextEntry1] : General: Not in acute distress, alert and oriented x3.

## 2024-01-06 NOTE — DISCUSSION/SUMMARY
[FreeTextEntry1] : Following management plan was outlined after detailed discussion with the patient.  [] I discussed with the patient that her MRI was negative for presence of any pancreatic mass.  She was encouraged to discuss the findings further with her PCP. [] Reassured her regarding negative workup for microscopic hematuria.  Discussed AUA guidelines for evaluation and management of microscopic hematuria.  Recommended following up with a urinalysis in a year unless otherwise indicated. [] I also reassured her that there is no evidence of incomplete bladder emptying on urodynamic testing. [] again offer her a trial of Myrbetriq 25 mg p.o.Daily or Solifenacin 5 mg p.o. daily for urinary frequency and urgency but she declines. [] Follow-up in 1 year

## 2024-01-08 ENCOUNTER — OFFICE (OUTPATIENT)
Dept: URBAN - METROPOLITAN AREA CLINIC 103 | Facility: CLINIC | Age: 79
Setting detail: OPHTHALMOLOGY
End: 2024-01-08
Payer: MEDICARE

## 2024-01-08 DIAGNOSIS — H16.221: ICD-10-CM

## 2024-01-08 DIAGNOSIS — H40.033: ICD-10-CM

## 2024-01-08 DIAGNOSIS — Z96.1: ICD-10-CM

## 2024-01-08 PROCEDURE — 92014 COMPRE OPH EXAM EST PT 1/>: CPT | Performed by: OPHTHALMOLOGY

## 2024-01-08 PROCEDURE — 92020 GONIOSCOPY: CPT | Performed by: OPHTHALMOLOGY

## 2024-01-08 PROCEDURE — 92250 FUNDUS PHOTOGRAPHY W/I&R: CPT | Performed by: OPHTHALMOLOGY

## 2024-01-08 ASSESSMENT — REFRACTION_AUTOREFRACTION
OS_CYLINDER: -0.75
OD_CYLINDER: -0.75
OS_SPHERE: -0.50
OS_AXIS: 088
OD_SPHERE: -1.25
OD_AXIS: 091

## 2024-01-08 ASSESSMENT — SPHEQUIV_DERIVED
OD_SPHEQUIV: -1.5
OD_SPHEQUIV: 0.25
OS_SPHEQUIV: 0.875
OS_SPHEQUIV: 0.875
OS_SPHEQUIV: -1
OS_SPHEQUIV: -0.875
OD_SPHEQUIV: 0.375
OD_SPHEQUIV: -1.625

## 2024-01-08 ASSESSMENT — CONFRONTATIONAL VISUAL FIELD TEST (CVF)
OD_FINDINGS: FULL
OS_FINDINGS: FULL

## 2024-01-08 ASSESSMENT — REFRACTION_MANIFEST
OS_CYLINDER: -1.75
OD_VA1: 20/25
OD_ADD: +2.25
OS_AXIS: 100
OS_SPHERE: +1.75
OS_SPHERE: PLANO
OD_AXIS: 85
OS_CYLINDER: -1.50
OU_VA: 20/20
OD_SPHERE: +1.00
OD_ADD: +2.25
OD_AXIS: 085
OS_CYLINDER: -1.75
OS_VA1: 20/50
OS_CYLINDER: -1.00
OD_VA1: 20/40
OD_AXIS: 089
OD_SPHERE: +1.00
OD_CYLINDER: -1.50
OS_AXIS: 060
OS_AXIS: 113
OS_SPHERE: +1.75
OS_VA1: 20/25-1
OD_SPHERE: -0.75
OS_AXIS: 105
OD_CYLINDER: -1.50
OS_VA1: 20/20
OD_VA1: 20/15-3
OU_VA: 20/15
OS_SPHERE: -0.25
OS_ADD: +2.25
OD_CYLINDER: -1.25
OS_ADD: +2.25
OS_VA1: 20/25

## 2024-01-08 ASSESSMENT — REFRACTION_CURRENTRX
OS_SPHERE: +3.25
OS_OVR_VA: 20/
OD_SPHERE: +3.25
OS_VPRISM_DIRECTION: SV
OD_VPRISM_DIRECTION: SV
OS_CYLINDER: -0.75
OD_CYLINDER: -0.75
OD_OVR_VA: 20/
OS_AXIS: 108
OD_AXIS: 094

## 2024-01-08 ASSESSMENT — SUPERFICIAL PUNCTATE KERATITIS (SPK): OD_SPK: T

## 2024-01-29 DIAGNOSIS — M79.602 PAIN IN RIGHT ARM: ICD-10-CM

## 2024-01-29 DIAGNOSIS — M54.2 CERVICALGIA: ICD-10-CM

## 2024-01-29 DIAGNOSIS — M79.601 PAIN IN RIGHT ARM: ICD-10-CM

## 2024-05-01 ENCOUNTER — APPOINTMENT (OUTPATIENT)
Dept: ORTHOPEDIC SURGERY | Facility: CLINIC | Age: 79
End: 2024-05-01
Payer: MEDICARE

## 2024-05-01 VITALS — HEIGHT: 62 IN | BODY MASS INDEX: 22.82 KG/M2 | WEIGHT: 124 LBS

## 2024-05-01 DIAGNOSIS — M67.449 GANGLION, UNSPECIFIED HAND: ICD-10-CM

## 2024-05-01 PROCEDURE — 99203 OFFICE O/P NEW LOW 30 MIN: CPT

## 2024-05-01 PROCEDURE — 99213 OFFICE O/P EST LOW 20 MIN: CPT

## 2024-05-05 PROBLEM — M67.449 MUCOUS CYST OF FINGER: Status: ACTIVE | Noted: 2024-05-05

## 2024-05-05 NOTE — ASSESSMENT
[FreeTextEntry1] : EXAM Left finger with mucous cyst. No erythema nor drainage, mild ttp. Able to flex and extend at MCP, PIP and DIP. Sensation intact throughout. <2sec cap refill   ASSESSMENT/PLAN Left finger mucous cyst - reviewed pathoanatomy with patient. Discussed management to consist of NSAIDs prn, activity modification and finger sleeves during use. Discussed excision is a potential operative intervention but is with a high rate of recurrence.   F/u prn

## 2024-05-05 NOTE — HISTORY OF PRESENT ILLNESS
[de-identified] : Age: 79 year F PMHx: HLD, Osteoporosis Hand Dominance: RHD Chief Complaint: Left thumb issues onset approx. March 2024 with NKI. Patient reports that her nail is having some issues. Patient reports that she went to a dermatologist for her symptoms and was told it could be a growth. Patient was prompted to f/u with a hand surgeon. Patient reports no pain or discomfort at this time. Denies numbness/tingling.  Trauma: NKI Outside Imaging/Treatment: none OTC Medications: none OT/PT: none Bracing: none Pain worse with: N/A Pain better with:  N/A

## 2024-05-14 ENCOUNTER — OFFICE (OUTPATIENT)
Dept: URBAN - METROPOLITAN AREA CLINIC 103 | Facility: CLINIC | Age: 79
Setting detail: OPHTHALMOLOGY
End: 2024-05-14
Payer: MEDICARE

## 2024-05-14 DIAGNOSIS — H00.12: ICD-10-CM

## 2024-05-14 DIAGNOSIS — H00.15: ICD-10-CM

## 2024-05-14 DIAGNOSIS — H01.001: ICD-10-CM

## 2024-05-14 DIAGNOSIS — H01.002: ICD-10-CM

## 2024-05-14 DIAGNOSIS — H01.004: ICD-10-CM

## 2024-05-14 DIAGNOSIS — H16.221: ICD-10-CM

## 2024-05-14 DIAGNOSIS — H01.005: ICD-10-CM

## 2024-05-14 PROCEDURE — 99213 OFFICE O/P EST LOW 20 MIN: CPT | Performed by: OPTOMETRIST

## 2024-05-14 ASSESSMENT — CONFRONTATIONAL VISUAL FIELD TEST (CVF)
OD_FINDINGS: FULL
OS_FINDINGS: FULL

## 2024-07-22 ENCOUNTER — OFFICE (OUTPATIENT)
Dept: URBAN - METROPOLITAN AREA CLINIC 103 | Facility: CLINIC | Age: 79
Setting detail: OPHTHALMOLOGY
End: 2024-07-22
Payer: MEDICARE

## 2024-07-22 DIAGNOSIS — H40.033: ICD-10-CM

## 2024-07-22 DIAGNOSIS — H16.221: ICD-10-CM

## 2024-07-22 DIAGNOSIS — H43.393: ICD-10-CM

## 2024-07-22 PROCEDURE — 92083 EXTENDED VISUAL FIELD XM: CPT | Performed by: OPHTHALMOLOGY

## 2024-07-22 PROCEDURE — 92133 CPTRZD OPH DX IMG PST SGM ON: CPT | Performed by: OPHTHALMOLOGY

## 2024-07-22 PROCEDURE — 92014 COMPRE OPH EXAM EST PT 1/>: CPT | Performed by: OPHTHALMOLOGY

## 2024-07-22 ASSESSMENT — CONFRONTATIONAL VISUAL FIELD TEST (CVF)
OD_FINDINGS: FULL
OS_FINDINGS: FULL

## 2024-07-24 ENCOUNTER — LABORATORY RESULT (OUTPATIENT)
Age: 79
End: 2024-07-24

## 2024-07-24 ENCOUNTER — APPOINTMENT (OUTPATIENT)
Dept: FAMILY MEDICINE | Facility: CLINIC | Age: 79
End: 2024-07-24
Payer: MEDICARE

## 2024-07-24 VITALS
OXYGEN SATURATION: 98 % | DIASTOLIC BLOOD PRESSURE: 88 MMHG | SYSTOLIC BLOOD PRESSURE: 178 MMHG | RESPIRATION RATE: 14 BRPM | HEIGHT: 62 IN | HEART RATE: 64 BPM | TEMPERATURE: 98.5 F | BODY MASS INDEX: 22.82 KG/M2 | WEIGHT: 124 LBS

## 2024-07-24 DIAGNOSIS — Q45.3 OTHER CONGENITAL MALFORMATIONS OF PANCREAS AND PANCREATIC DUCT: ICD-10-CM

## 2024-07-24 DIAGNOSIS — B00.9 HERPESVIRAL INFECTION, UNSPECIFIED: ICD-10-CM

## 2024-07-24 PROCEDURE — 36415 COLL VENOUS BLD VENIPUNCTURE: CPT

## 2024-07-24 PROCEDURE — 99214 OFFICE O/P EST MOD 30 MIN: CPT

## 2024-07-24 PROCEDURE — G2211 COMPLEX E/M VISIT ADD ON: CPT

## 2024-07-24 NOTE — PHYSICAL EXAM
[No Acute Distress] : no acute distress [Normal Sclera/Conjunctiva] : normal sclera/conjunctiva [Normal Outer Ear/Nose] : the outer ears and nose were normal in appearance [No JVD] : no jugular venous distention [No Lymphadenopathy] : no lymphadenopathy [Supple] : supple [Thyroid Normal, No Nodules] : the thyroid was normal and there were no nodules present [No Respiratory Distress] : no respiratory distress  [No Accessory Muscle Use] : no accessory muscle use [Clear to Auscultation] : lungs were clear to auscultation bilaterally [Normal Rate] : normal rate  [Regular Rhythm] : with a regular rhythm [Normal S1, S2] : normal S1 and S2 [No Murmur] : no murmur heard [Soft] : abdomen soft [Non Tender] : non-tender [Non-distended] : non-distended [No HSM] : no HSM [Normal Bowel Sounds] : normal bowel sounds [Normal Gait] : normal gait [Alert and Oriented x3] : oriented to person, place, and time [de-identified] : herpetic lesion on left buttock

## 2024-07-24 NOTE — PLAN
[FreeTextEntry1] : reviewed CT abdomen along with MRI suggestive of main duct IPMN no EUS or further work up since Nov 2023.  will refer along with routine labs   start Valtrex as directed  RTO 2 months

## 2024-07-24 NOTE — HISTORY OF PRESENT ILLNESS
[FreeTextEntry8] : 79F is here due to recurrent rash buttock on lower back that is itchy. She had MRI per uro-gyn due to chronic hematuria found to have abnormal pancreatic findings. She has no current abdominal symptoms so she had not come into office for follow up. Denies abdominal pain, weight loss.

## 2024-07-25 LAB
ALBUMIN SERPL ELPH-MCNC: 4.5 G/DL
ALP BLD-CCNC: 63 U/L
ALT SERPL-CCNC: 23 U/L
AMYLASE/CREAT SERPL: 77 U/L
ANION GAP SERPL CALC-SCNC: 11 MMOL/L
APPEARANCE: CLEAR
AST SERPL-CCNC: 31 U/L
BASOPHILS # BLD AUTO: 0.05 K/UL
BASOPHILS NFR BLD AUTO: 0.8 %
BILIRUB SERPL-MCNC: 0.4 MG/DL
BILIRUBIN URINE: NEGATIVE
BLOOD URINE: ABNORMAL
BUN SERPL-MCNC: 21 MG/DL
CALCIUM SERPL-MCNC: 10 MG/DL
CANCER AG19-9 SERPL-ACNC: <2 U/ML
CHLORIDE SERPL-SCNC: 97 MMOL/L
CO2 SERPL-SCNC: 29 MMOL/L
COLOR: YELLOW
CREAT SERPL-MCNC: 1.16 MG/DL
EGFR: 48 ML/MIN/1.73M2
EOSINOPHIL # BLD AUTO: 0.34 K/UL
EOSINOPHIL NFR BLD AUTO: 5.4 %
GLUCOSE QUALITATIVE U: NEGATIVE MG/DL
GLUCOSE SERPL-MCNC: 93 MG/DL
HCT VFR BLD CALC: 41.5 %
HGB BLD-MCNC: 13 G/DL
IMM GRANULOCYTES NFR BLD AUTO: 0.3 %
KETONES URINE: NEGATIVE MG/DL
LEUKOCYTE ESTERASE URINE: ABNORMAL
LPL SERPL-CCNC: 39 U/L
LYMPHOCYTES # BLD AUTO: 2.11 K/UL
LYMPHOCYTES NFR BLD AUTO: 33.8 %
MAN DIFF?: NORMAL
MCHC RBC-ENTMCNC: 28 PG
MCHC RBC-ENTMCNC: 31.3 GM/DL
MCV RBC AUTO: 89.2 FL
MONOCYTES # BLD AUTO: 0.53 K/UL
MONOCYTES NFR BLD AUTO: 8.5 %
NEUTROPHILS # BLD AUTO: 3.2 K/UL
NEUTROPHILS NFR BLD AUTO: 51.2 %
NITRITE URINE: NEGATIVE
PH URINE: 8
PLATELET # BLD AUTO: 250 K/UL
POTASSIUM SERPL-SCNC: 5.6 MMOL/L
PROT SERPL-MCNC: 7 G/DL
PROTEIN URINE: NEGATIVE MG/DL
RBC # BLD: 4.65 M/UL
RBC # FLD: 14.2 %
SODIUM SERPL-SCNC: 136 MMOL/L
SPECIFIC GRAVITY URINE: 1.01
T4 FREE SERPL-MCNC: 1.3 NG/DL
TSH SERPL-ACNC: 1.51 UIU/ML
UROBILINOGEN URINE: 0.2 MG/DL
WBC # FLD AUTO: 6.25 K/UL

## 2024-08-02 ENCOUNTER — APPOINTMENT (OUTPATIENT)
Dept: GASTROENTEROLOGY | Facility: CLINIC | Age: 79
End: 2024-08-02
Payer: MEDICARE

## 2024-08-02 VITALS
SYSTOLIC BLOOD PRESSURE: 172 MMHG | WEIGHT: 124 LBS | BODY MASS INDEX: 22.82 KG/M2 | TEMPERATURE: 97 F | HEIGHT: 62 IN | OXYGEN SATURATION: 97 % | DIASTOLIC BLOOD PRESSURE: 86 MMHG | RESPIRATION RATE: 14 BRPM | HEART RATE: 74 BPM

## 2024-08-02 DIAGNOSIS — K86.89 OTHER SPECIFIED DISEASES OF PANCREAS: ICD-10-CM

## 2024-08-02 PROCEDURE — 99203 OFFICE O/P NEW LOW 30 MIN: CPT

## 2024-08-02 RX ORDER — VALACYCLOVIR 1 G/1
1 TABLET, FILM COATED ORAL
Qty: 28 | Refills: 3 | Status: DISCONTINUED | COMMUNITY
Start: 2024-07-24 | End: 2024-08-02

## 2024-08-02 NOTE — PLAN
[TextEntry] : Patient will contact office with decision regarding more invasive evaluation versus interval dedicated pancreatic imaging. Additional recommendations to follow based on patient preference. Interval for follow-up in office based on above decision.

## 2024-08-02 NOTE — ASSESSMENT
[FreeTextEntry1] : Ms. Shaver is a 79-year-old woman referred for evaluation of abnormal MRI results showing diffuse main pancreatic duct dilation.  Discussed potential strategy for surveillance versus endoscopic evaluation.  Patient and family will consider options.  Reasonable to consider endoscopic evaluation given chronic dilated pancreatic duct and concern for possible main duct IPMN and malignancy potential however given age and lack of evidence concerning for obvious obstructing mass or stone could also be reasonable to follow with interval imaging.

## 2024-08-02 NOTE — PHYSICAL EXAM
[Alert] : alert [Normal Voice/Communication] : normal voice/communication [Healthy Appearing] : healthy appearing [No Acute Distress] : no acute distress [Sclera] : the sclera and conjunctiva were normal [Hearing Threshold Finger Rub Not [place]] : hearing was normal [Normal Lips/Gums] : the lips and gums were normal [Oropharynx] : the oropharynx was normal [Normal Appearance] : the appearance of the neck was normal [No Neck Mass] : no neck mass was observed [No Acc Muscle Use] : no accessory muscle use [No Respiratory Distress] : no respiratory distress [Respiration, Rhythm And Depth] : normal respiratory rhythm and effort [Auscultation Breath Sounds / Voice Sounds] : lungs were clear to auscultation bilaterally [Heart Rate And Rhythm] : heart rate was normal and rhythm regular [Normal S1, S2] : normal S1 and S2 [Murmurs] : no murmurs [Bowel Sounds] : normal bowel sounds [Abdomen Tenderness] : non-tender [No Masses] : no abdominal mass palpated [Abdomen Soft] : soft [] : no hepatosplenomegaly [Oriented To Time, Place, And Person] : oriented to person, place, and time

## 2024-08-02 NOTE — HISTORY OF PRESENT ILLNESS
[FreeTextEntry1] : Ms. Shaver is a 79-year-old woman referred for evaluation of abnormal MRI results showing diffuse main pancreatic duct dilation.  MRCP performed in November 2023 significant for pancreatic duct dilation diffusely throughout the neck, body and tail measuring up to 8 mm in maximal diameter, numerous tiny, dilated side branches were also demonstrated.  The downstream pancreatic duct at the level of the head of pancreas was normal in caliber.  There was no discrete obstructing mass or stone at the level of the caliber change.  The pancreatic parenchyma was also moderately diffusely atrophied.  There is no evidence of acute or chronic pancreatitis.  Of note, evaluation markedly limited by motion artifact. It was recommended that patient follow with advanced GI regarding further evaluation.  In office today, patient without acute concern.  MR findings found incidentally during evaluation by urology. Patient denies any alarming features. Patient denies any known family history of gastrointestinal malignancy. Not currently on any cardiac or diabetes medications. Patient endorses feeling at their baseline level of health without acute concerns in office today.

## 2024-08-15 ENCOUNTER — APPOINTMENT (OUTPATIENT)
Dept: GASTROENTEROLOGY | Facility: HOSPITAL | Age: 79
End: 2024-08-15

## 2024-08-15 ENCOUNTER — TRANSCRIPTION ENCOUNTER (OUTPATIENT)
Age: 79
End: 2024-08-15

## 2024-08-15 DIAGNOSIS — K86.89 OTHER SPECIFIED DISEASES OF PANCREAS: ICD-10-CM

## 2024-08-15 NOTE — HISTORY OF PRESENT ILLNESS
[FreeTextEntry1] : Ms. Shaver is a 79-year-old woman referred for evaluation of abnormal MRI results showing diffuse main pancreatic duct dilation.

## 2024-08-15 NOTE — ASSESSMENT
[FreeTextEntry1] : Ms. Shaver is a 79-year-old woman referred for evaluation of abnormal MRI results showing diffuse main pancreatic duct dilation. Risks, benefits and alternatives discussed in detail. Patient medically optimized and agreeable to proceed with planned procedure.

## 2024-08-15 NOTE — PHYSICAL EXAM

## 2024-09-25 ENCOUNTER — NON-APPOINTMENT (OUTPATIENT)
Age: 79
End: 2024-09-25

## 2024-09-25 ENCOUNTER — APPOINTMENT (OUTPATIENT)
Dept: FAMILY MEDICINE | Facility: CLINIC | Age: 79
End: 2024-09-25
Payer: MEDICARE

## 2024-09-25 VITALS — DIASTOLIC BLOOD PRESSURE: 70 MMHG | SYSTOLIC BLOOD PRESSURE: 145 MMHG

## 2024-09-25 VITALS
RESPIRATION RATE: 14 BRPM | OXYGEN SATURATION: 98 % | DIASTOLIC BLOOD PRESSURE: 86 MMHG | SYSTOLIC BLOOD PRESSURE: 136 MMHG | WEIGHT: 125 LBS | HEIGHT: 62 IN | HEART RATE: 62 BPM | BODY MASS INDEX: 23 KG/M2

## 2024-09-25 DIAGNOSIS — E78.5 HYPERLIPIDEMIA, UNSPECIFIED: ICD-10-CM

## 2024-09-25 DIAGNOSIS — R55 SYNCOPE AND COLLAPSE: ICD-10-CM

## 2024-09-25 DIAGNOSIS — D49.0 NEOPLASM OF UNSPECIFIED BEHAVIOR OF DIGESTIVE SYSTEM: ICD-10-CM

## 2024-09-25 PROCEDURE — 93000 ELECTROCARDIOGRAM COMPLETE: CPT

## 2024-09-25 PROCEDURE — 99214 OFFICE O/P EST MOD 30 MIN: CPT

## 2024-09-25 PROCEDURE — G2211 COMPLEX E/M VISIT ADD ON: CPT

## 2024-09-25 PROCEDURE — 36415 COLL VENOUS BLD VENIPUNCTURE: CPT

## 2024-09-25 NOTE — PLAN
[FreeTextEntry1] : recurrent syncopal episodes in patient with prior normal work up from neuro and cardiology. In office VSS stable, orthostatic negative, ECG sinus bradycardia unchanged from prior. In office examination no deficits noted. Will refer back to cardio and neuro. Will repeat labs. Increase water intake, fall precautions.   Reviewed prior neuro and cardio notes  reviewd most recent GI consult note and imaging   RTO as routine for follow up.

## 2024-09-25 NOTE — PHYSICAL EXAM
[Normal Sclera/Conjunctiva] : normal sclera/conjunctiva [Normal Outer Ear/Nose] : the outer ears and nose were normal in appearance [No Respiratory Distress] : no respiratory distress  [No Accessory Muscle Use] : no accessory muscle use [Clear to Auscultation] : lungs were clear to auscultation bilaterally [Normal Rate] : normal rate  [Regular Rhythm] : with a regular rhythm [Normal S1, S2] : normal S1 and S2 [No Murmur] : no murmur heard [Pedal Pulses Present] : the pedal pulses are present [No Extremity Clubbing/Cyanosis] : no extremity clubbing/cyanosis [Normal Gait] : normal gait [Alert and Oriented x3] : oriented to person, place, and time

## 2024-09-25 NOTE — REVIEW OF SYSTEMS
[Fainting] : fainting [Negative] : Heme/Lymph [Headache] : no headache [Dizziness] : no dizziness [Memory Loss] : no memory loss [Unsteady Walking] : no ataxia

## 2024-09-25 NOTE — HISTORY OF PRESENT ILLNESS
[Spouse] : spouse [FreeTextEntry1] : Here for 2 month follow up. Got endoscopy done 2 months ago. [de-identified] : 79F is here for follow up.  States on Monday while sitting on couch with feet up working on Ipad and laptop she fainted.  She was seen by GI due to abnormal pancreas on MRI. She had recent infection of her finger and had surgery.  Denies changes in vision, headaches, dizziness.

## 2024-09-27 LAB
BASOPHILS # BLD AUTO: 0.06 K/UL
BASOPHILS NFR BLD AUTO: 0.9 %
EOSINOPHIL # BLD AUTO: 0.32 K/UL
EOSINOPHIL NFR BLD AUTO: 4.8 %
FERRITIN SERPL-MCNC: 81 NG/ML
HCT VFR BLD CALC: 39.2 %
HGB BLD-MCNC: 12.7 G/DL
IMM GRANULOCYTES NFR BLD AUTO: 0.1 %
IRON SATN MFR SERPL: 24 %
IRON SERPL-MCNC: 66 UG/DL
LYMPHOCYTES # BLD AUTO: 2.33 K/UL
LYMPHOCYTES NFR BLD AUTO: 34.6 %
MAN DIFF?: NORMAL
MCHC RBC-ENTMCNC: 29.4 PG
MCHC RBC-ENTMCNC: 32.4 GM/DL
MCV RBC AUTO: 90.7 FL
MONOCYTES # BLD AUTO: 0.58 K/UL
MONOCYTES NFR BLD AUTO: 8.6 %
NEUTROPHILS # BLD AUTO: 3.43 K/UL
NEUTROPHILS NFR BLD AUTO: 51 %
PLATELET # BLD AUTO: 224 K/UL
RBC # BLD: 4.32 M/UL
RBC # FLD: 14.9 %
TIBC SERPL-MCNC: 275 UG/DL
TRANSFERRIN SERPL-MCNC: 228 MG/DL
UIBC SERPL-MCNC: 210 UG/DL
WBC # FLD AUTO: 6.73 K/UL

## 2024-10-28 ENCOUNTER — APPOINTMENT (OUTPATIENT)
Dept: GASTROENTEROLOGY | Facility: CLINIC | Age: 79
End: 2024-10-28

## 2025-01-31 ENCOUNTER — NON-APPOINTMENT (OUTPATIENT)
Age: 80
End: 2025-01-31

## 2025-02-05 ENCOUNTER — APPOINTMENT (OUTPATIENT)
Dept: GASTROENTEROLOGY | Facility: CLINIC | Age: 80
End: 2025-02-05
Payer: MEDICARE

## 2025-02-05 VITALS
HEART RATE: 86 BPM | BODY MASS INDEX: 24.29 KG/M2 | HEIGHT: 62 IN | WEIGHT: 132 LBS | SYSTOLIC BLOOD PRESSURE: 170 MMHG | OXYGEN SATURATION: 97 % | RESPIRATION RATE: 14 BRPM | DIASTOLIC BLOOD PRESSURE: 92 MMHG | TEMPERATURE: 97.6 F

## 2025-02-05 DIAGNOSIS — R13.10 DYSPHAGIA, UNSPECIFIED: ICD-10-CM

## 2025-02-05 DIAGNOSIS — K86.89 OTHER SPECIFIED DISEASES OF PANCREAS: ICD-10-CM

## 2025-02-05 PROCEDURE — 99213 OFFICE O/P EST LOW 20 MIN: CPT

## 2025-03-10 ENCOUNTER — OFFICE (OUTPATIENT)
Dept: URBAN - METROPOLITAN AREA CLINIC 103 | Facility: CLINIC | Age: 80
Setting detail: OPHTHALMOLOGY
End: 2025-03-10
Payer: MEDICARE

## 2025-03-10 DIAGNOSIS — H40.033: ICD-10-CM

## 2025-03-10 DIAGNOSIS — Z96.1: ICD-10-CM

## 2025-03-10 DIAGNOSIS — H16.221: ICD-10-CM

## 2025-03-10 DIAGNOSIS — H43.393: ICD-10-CM

## 2025-03-10 PROCEDURE — 92014 COMPRE OPH EXAM EST PT 1/>: CPT | Performed by: OPHTHALMOLOGY

## 2025-03-10 PROCEDURE — 92250 FUNDUS PHOTOGRAPHY W/I&R: CPT | Performed by: OPHTHALMOLOGY

## 2025-03-10 PROCEDURE — 92020 GONIOSCOPY: CPT | Performed by: OPHTHALMOLOGY

## 2025-03-10 ASSESSMENT — REFRACTION_CURRENTRX
OD_AXIS: 094
OS_VPRISM_DIRECTION: SV
OD_CYLINDER: -0.75
OD_SPHERE: +3.25
OD_VPRISM_DIRECTION: SV
OS_SPHERE: +3.25
OS_CYLINDER: -0.75
OD_OVR_VA: 20/
OS_OVR_VA: 20/
OS_AXIS: 108

## 2025-03-10 ASSESSMENT — REFRACTION_MANIFEST
OS_SPHERE: +1.75
OD_VA1: 20/40
OS_VA1: 20/20
OD_ADD: +2.25
OS_SPHERE: +1.75
OS_AXIS: 105
OD_VA1: 20/15-3
OS_SPHERE: -0.25
OS_CYLINDER: -1.00
OD_AXIS: 085
OD_CYLINDER: -1.25
OS_CYLINDER: -1.50
OS_VA1: 20/50
OS_CYLINDER: -1.75
OD_SPHERE: -0.75
OD_SPHERE: +1.00
OD_CYLINDER: -1.50
OS_CYLINDER: -1.75
OS_VA1: 20/25
OD_CYLINDER: -1.50
OD_VA1: 20/25
OU_VA: 20/15
OD_AXIS: 089
OS_ADD: +2.25
OS_AXIS: 060
OD_AXIS: 85
OD_ADD: +2.25
OS_AXIS: 113
OS_SPHERE: PLANO
OS_AXIS: 100
OS_VA1: 20/25-1
OD_SPHERE: +1.00
OU_VA: 20/20
OS_ADD: +2.25

## 2025-03-10 ASSESSMENT — VISUAL ACUITY
OD_BCVA: 20/20-2
OS_BCVA: 20/30-2

## 2025-03-10 ASSESSMENT — KERATOMETRY
METHOD_AUTO_MANUAL: AUTO
OD_AXISANGLE_DEGREES: 164
OD_K2POWER_DIOPTERS: 44.75
OS_K1POWER_DIOPTERS: 43.00
OS_AXISANGLE_DEGREES: 005
OD_K1POWER_DIOPTERS: 44.00
OS_K2POWER_DIOPTERS: 43.50

## 2025-03-10 ASSESSMENT — PACHYMETRY
OD_CT_CORRECTION: 3
OS_CT_CORRECTION: 3
OD_CT_UM: 506
OS_CT_UM: 506

## 2025-03-10 ASSESSMENT — SUPERFICIAL PUNCTATE KERATITIS (SPK): OD_SPK: T

## 2025-03-10 ASSESSMENT — REFRACTION_AUTOREFRACTION
OS_AXIS: 099
OD_AXIS: 082
OS_CYLINDER: -1.50
OD_SPHERE: -0.50
OD_CYLINDER: -1.00
OS_SPHERE: +0.25

## 2025-03-10 ASSESSMENT — CONFRONTATIONAL VISUAL FIELD TEST (CVF)
OD_FINDINGS: FULL
OS_FINDINGS: FULL

## 2025-03-10 ASSESSMENT — TONOMETRY
OD_IOP_MMHG: 12
OS_IOP_MMHG: 12

## 2025-05-27 ENCOUNTER — OFFICE (OUTPATIENT)
Dept: URBAN - METROPOLITAN AREA CLINIC 103 | Facility: CLINIC | Age: 80
Setting detail: OPHTHALMOLOGY
End: 2025-05-27
Payer: MEDICARE

## 2025-05-27 DIAGNOSIS — H00.15: ICD-10-CM

## 2025-05-27 PROCEDURE — 99213 OFFICE O/P EST LOW 20 MIN: CPT | Performed by: OPHTHALMOLOGY

## 2025-05-27 ASSESSMENT — REFRACTION_MANIFEST
OU_VA: 20/15
OS_VA1: 20/20
OS_AXIS: 100
OD_SPHERE: -0.75
OD_ADD: +2.25
OD_AXIS: 85
OS_SPHERE: +1.75
OS_AXIS: 113
OS_SPHERE: -0.25
OS_VA1: 20/25
OS_ADD: +2.25
OD_AXIS: 085
OS_CYLINDER: -1.75
OS_CYLINDER: -1.75
OS_SPHERE: +1.75
OS_AXIS: 060
OD_ADD: +2.25
OD_CYLINDER: -1.50
OD_CYLINDER: -1.50
OS_VA1: 20/50
OS_SPHERE: PLANO
OD_SPHERE: +1.00
OS_CYLINDER: -1.50
OD_VA1: 20/40
OD_VA1: 20/25
OS_AXIS: 105
OD_VA1: 20/15-3
OS_ADD: +2.25
OD_CYLINDER: -1.25
OU_VA: 20/20
OS_CYLINDER: -1.00
OS_VA1: 20/25-1
OD_SPHERE: +1.00
OD_AXIS: 089

## 2025-05-27 ASSESSMENT — PACHYMETRY
OD_CT_UM: 506
OS_CT_UM: 506
OD_CT_CORRECTION: 3
OS_CT_CORRECTION: 3

## 2025-05-27 ASSESSMENT — REFRACTION_CURRENTRX
OS_CYLINDER: -0.75
OS_VPRISM_DIRECTION: SV
OD_VPRISM_DIRECTION: SV
OD_OVR_VA: 20/
OS_SPHERE: +3.25
OS_OVR_VA: 20/
OD_SPHERE: +3.25
OS_AXIS: 108
OD_AXIS: 094
OD_CYLINDER: -0.75

## 2025-05-27 ASSESSMENT — REFRACTION_AUTOREFRACTION
OD_AXIS: 082
OD_CYLINDER: -1.00
OD_SPHERE: -0.50
OS_CYLINDER: -1.50
OS_AXIS: 099
OS_SPHERE: +0.25

## 2025-05-27 ASSESSMENT — CONFRONTATIONAL VISUAL FIELD TEST (CVF)
OD_FINDINGS: FULL
OS_FINDINGS: FULL

## 2025-05-27 ASSESSMENT — KERATOMETRY
METHOD_AUTO_MANUAL: AUTO
OD_AXISANGLE_DEGREES: 164
OS_AXISANGLE_DEGREES: 005
OS_K2POWER_DIOPTERS: 43.50
OD_K1POWER_DIOPTERS: 44.00
OS_K1POWER_DIOPTERS: 43.00
OD_K2POWER_DIOPTERS: 44.75

## 2025-05-27 ASSESSMENT — SUPERFICIAL PUNCTATE KERATITIS (SPK): OD_SPK: T

## 2025-05-27 ASSESSMENT — VISUAL ACUITY
OS_BCVA: 20/25-2
OD_BCVA: 20/25+2

## 2025-06-10 ENCOUNTER — OFFICE (OUTPATIENT)
Dept: URBAN - METROPOLITAN AREA CLINIC 103 | Facility: CLINIC | Age: 80
Setting detail: OPHTHALMOLOGY
End: 2025-06-10
Payer: MEDICARE

## 2025-06-10 DIAGNOSIS — H00.15: ICD-10-CM

## 2025-06-10 DIAGNOSIS — H01.001: ICD-10-CM

## 2025-06-10 DIAGNOSIS — H01.005: ICD-10-CM

## 2025-06-10 DIAGNOSIS — H01.004: ICD-10-CM

## 2025-06-10 DIAGNOSIS — H01.002: ICD-10-CM

## 2025-06-10 PROBLEM — H02.834 DERMATOCHALASIS; RIGHT UPPER LID, LEFT UPPER LID: Status: ACTIVE | Noted: 2025-06-10

## 2025-06-10 PROBLEM — H02.831 DERMATOCHALASIS; RIGHT UPPER LID, LEFT UPPER LID: Status: ACTIVE | Noted: 2025-06-10

## 2025-06-10 PROCEDURE — 92012 INTRM OPH EXAM EST PATIENT: CPT | Performed by: OPTOMETRIST

## 2025-06-10 ASSESSMENT — REFRACTION_MANIFEST
OS_CYLINDER: -1.50
OD_CYLINDER: -1.25
OD_ADD: +2.25
OD_VA1: 20/40
OS_VA1: 20/50
OS_VA1: 20/25
OS_SPHERE: +1.75
OD_VA1: 20/15-3
OD_AXIS: 85
OS_AXIS: 060
OS_SPHERE: -0.25
OS_CYLINDER: -1.75
OS_AXIS: 113
OD_ADD: +2.25
OS_CYLINDER: -1.00
OS_AXIS: 100
OS_SPHERE: +1.75
OD_AXIS: 085
OU_VA: 20/20
OD_SPHERE: +1.00
OS_ADD: +2.25
OS_AXIS: 105
OD_CYLINDER: -1.50
OS_ADD: +2.25
OS_SPHERE: PLANO
OS_CYLINDER: -1.75
OD_CYLINDER: -1.50
OS_VA1: 20/20
OD_AXIS: 089
OD_SPHERE: -0.75
OD_VA1: 20/25
OD_SPHERE: +1.00
OU_VA: 20/15
OS_VA1: 20/25-1

## 2025-06-10 ASSESSMENT — KERATOMETRY
OD_K2POWER_DIOPTERS: 44.75
METHOD_AUTO_MANUAL: AUTO
OS_AXISANGLE_DEGREES: 005
OD_K1POWER_DIOPTERS: 44.00
OS_K2POWER_DIOPTERS: 43.50
OD_AXISANGLE_DEGREES: 164
OS_K1POWER_DIOPTERS: 43.00

## 2025-06-10 ASSESSMENT — REFRACTION_AUTOREFRACTION
OD_CYLINDER: -1.00
OD_SPHERE: -0.50
OS_AXIS: 099
OS_CYLINDER: -1.50
OD_AXIS: 082
OS_SPHERE: +0.25

## 2025-06-10 ASSESSMENT — REFRACTION_CURRENTRX
OS_CYLINDER: -0.75
OD_OVR_VA: 20/
OS_SPHERE: +3.25
OS_AXIS: 108
OD_VPRISM_DIRECTION: SV
OD_AXIS: 094
OD_SPHERE: +3.25
OS_OVR_VA: 20/
OS_VPRISM_DIRECTION: SV
OD_CYLINDER: -0.75

## 2025-06-10 ASSESSMENT — VISUAL ACUITY
OS_BCVA: 20/30-1
OD_BCVA: 20/40-1

## 2025-06-10 ASSESSMENT — LID POSITION - DERMATOCHALASIS
OS_DERMATOCHALASIS: LUL
OD_DERMATOCHALASIS: RUL

## 2025-06-10 ASSESSMENT — SUPERFICIAL PUNCTATE KERATITIS (SPK): OD_SPK: T

## 2025-07-08 ENCOUNTER — OFFICE (OUTPATIENT)
Dept: URBAN - METROPOLITAN AREA CLINIC 103 | Facility: CLINIC | Age: 80
Setting detail: OPHTHALMOLOGY
End: 2025-07-08
Payer: MEDICARE

## 2025-07-08 DIAGNOSIS — H02.831: ICD-10-CM

## 2025-07-08 DIAGNOSIS — H01.005: ICD-10-CM

## 2025-07-08 DIAGNOSIS — H16.221: ICD-10-CM

## 2025-07-08 DIAGNOSIS — H01.002: ICD-10-CM

## 2025-07-08 DIAGNOSIS — H02.834: ICD-10-CM

## 2025-07-08 DIAGNOSIS — H01.001: ICD-10-CM

## 2025-07-08 DIAGNOSIS — H01.004: ICD-10-CM

## 2025-07-08 PROCEDURE — 92012 INTRM OPH EXAM EST PATIENT: CPT | Performed by: OPTOMETRIST

## 2025-07-08 ASSESSMENT — REFRACTION_MANIFEST
OU_VA: 20/15
OS_SPHERE: +1.75
OD_ADD: +2.25
OD_AXIS: 085
OD_AXIS: 85
OU_VA: 20/20
OD_VA1: 20/15-3
OD_CYLINDER: -1.50
OD_VA1: 20/25
OS_AXIS: 100
OD_AXIS: 089
OS_CYLINDER: -1.50
OS_CYLINDER: -1.00
OD_SPHERE: +1.00
OD_SPHERE: +1.00
OD_VA1: 20/40
OS_CYLINDER: -1.75
OD_SPHERE: -0.75
OS_AXIS: 105
OS_VA1: 20/50
OD_CYLINDER: -1.50
OS_SPHERE: PLANO
OS_ADD: +2.25
OS_VA1: 20/20
OS_ADD: +2.25
OS_AXIS: 113
OS_CYLINDER: -1.75
OS_AXIS: 060
OD_ADD: +2.25
OS_SPHERE: +1.75
OS_VA1: 20/25-1
OS_VA1: 20/25
OS_SPHERE: -0.25
OD_CYLINDER: -1.25

## 2025-07-08 ASSESSMENT — REFRACTION_CURRENTRX
OS_SPHERE: +3.25
OD_CYLINDER: -0.75
OD_VPRISM_DIRECTION: SV
OD_OVR_VA: 20/
OS_AXIS: 108
OS_VPRISM_DIRECTION: SV
OS_OVR_VA: 20/
OD_AXIS: 094
OS_CYLINDER: -0.75
OD_SPHERE: +3.25

## 2025-07-08 ASSESSMENT — KERATOMETRY
OS_K1POWER_DIOPTERS: 43.50
OD_AXISANGLE_DEGREES: 175
OS_K2POWER_DIOPTERS: 43.75
METHOD_AUTO_MANUAL: AUTO
OS_AXISANGLE_DEGREES: 177
OD_K2POWER_DIOPTERS: 44.25
OD_K1POWER_DIOPTERS: 43.75

## 2025-07-08 ASSESSMENT — TONOMETRY
OS_IOP_MMHG: 14
OD_IOP_MMHG: 12

## 2025-07-08 ASSESSMENT — SUPERFICIAL PUNCTATE KERATITIS (SPK): OD_SPK: T

## 2025-07-08 ASSESSMENT — LID POSITION - DERMATOCHALASIS
OD_DERMATOCHALASIS: RUL
OS_DERMATOCHALASIS: LUL

## 2025-07-08 ASSESSMENT — REFRACTION_AUTOREFRACTION
OS_CYLINDER: -1.25
OD_SPHERE: UTP
OS_AXIS: 094
OS_SPHERE: -0.25

## 2025-07-08 ASSESSMENT — PACHYMETRY
OD_CT_UM: 506
OS_CT_CORRECTION: 3
OS_CT_UM: 506
OD_CT_CORRECTION: 3

## 2025-07-08 ASSESSMENT — VISUAL ACUITY
OD_BCVA: 20/25-1
OS_BCVA: 20/30-1

## 2025-07-14 ENCOUNTER — NON-APPOINTMENT (OUTPATIENT)
Age: 80
End: 2025-07-14

## 2025-07-14 ENCOUNTER — APPOINTMENT (OUTPATIENT)
Dept: FAMILY MEDICINE | Facility: CLINIC | Age: 80
End: 2025-07-14
Payer: MEDICARE

## 2025-07-14 PROCEDURE — 99213 OFFICE O/P EST LOW 20 MIN: CPT | Mod: 93

## 2025-07-14 RX ORDER — AMOXICILLIN 500 MG/1
500 CAPSULE ORAL
Qty: 14 | Refills: 0 | Status: ACTIVE | COMMUNITY
Start: 2025-07-14 | End: 1900-01-01